# Patient Record
Sex: MALE | Race: BLACK OR AFRICAN AMERICAN | NOT HISPANIC OR LATINO | Employment: FULL TIME | URBAN - METROPOLITAN AREA
[De-identification: names, ages, dates, MRNs, and addresses within clinical notes are randomized per-mention and may not be internally consistent; named-entity substitution may affect disease eponyms.]

---

## 2022-07-14 ENCOUNTER — HOSPITAL ENCOUNTER (EMERGENCY)
Facility: HOSPITAL | Age: 27
Discharge: HOME | End: 2022-07-15
Attending: EMERGENCY MEDICINE
Payer: COMMERCIAL

## 2022-07-14 VITALS
TEMPERATURE: 97 F | WEIGHT: 112 LBS | DIASTOLIC BLOOD PRESSURE: 83 MMHG | OXYGEN SATURATION: 98 % | RESPIRATION RATE: 16 BRPM | SYSTOLIC BLOOD PRESSURE: 123 MMHG | HEART RATE: 70 BPM

## 2022-07-14 DIAGNOSIS — H18.603 KERATOCONUS OF BOTH EYES: Primary | ICD-10-CM

## 2022-07-14 PROCEDURE — 99283 EMERGENCY DEPT VISIT LOW MDM: CPT

## 2022-07-14 NOTE — Clinical Note
Jony Pena was seen and treated in our emergency department on 7/14/2022  Diagnosis:     Kailee Benitez    He may return on this date: If you have any questions or concerns, please don't hesitate to call        Abraham Mascorro MD    ______________________________           _______________          _______________  Hospital Representative                              Date                                Time

## 2022-07-15 PROCEDURE — 99284 EMERGENCY DEPT VISIT MOD MDM: CPT | Performed by: EMERGENCY MEDICINE

## 2022-07-15 RX ORDER — KETOROLAC TROMETHAMINE 5 MG/ML
1 SOLUTION OPHTHALMIC EVERY 6 HOURS
Qty: 3 ML | Refills: 0 | Status: SHIPPED | OUTPATIENT
Start: 2022-07-15

## 2022-07-15 RX ORDER — TETRACAINE HYDROCHLORIDE 5 MG/ML
1 SOLUTION OPHTHALMIC ONCE
Status: COMPLETED | OUTPATIENT
Start: 2022-07-15 | End: 2022-07-15

## 2022-07-15 RX ADMIN — FLUORESCEIN SODIUM 1 STRIP: 1 STRIP OPHTHALMIC at 00:11

## 2022-07-15 RX ADMIN — TETRACAINE HYDROCHLORIDE 1 DROP: 5 SOLUTION OPHTHALMIC at 00:11

## 2022-07-15 NOTE — ED PROVIDER NOTES
Final Diagnosis:  1  Keratoconus of both eyes        Chief Complaint   Patient presents with    Eye Pain     Patient has left eye irritation, has contact in both eyes, put contact in eyes about an hour ago and that's when eye irritation started, also concerned because he does have and eye disease, had surgery on right, doctor in [place] said his contact may be tight on left side     HPI  Patient has h/o keratoconus both eyes  He had corrective surgery for right eye before  Has corrective lenses otherwise  Been wearing left lens for awhile  Today tried ot put it in and irritated eye  Think's it's fitting wrong  Has another to try  When he takes it out feels better but eye sensitive to light and vision wrong  I stain with fluorescein no abrasion  Pressure in right 10 mmhg  No injection  Eye surgoen in [place] following  Planning for corneal transplant left  - No language barrier    - History obtained from patient  - There are no limitations to the history obtained  - Previous charting underwent limited review with attention to last ED visits, labs, ekgs, and prior imaging  PMH:   has a past medical history of Eye abnormality  PSH:   has a past surgical history that includes EYE SURGERY (Right)  Social History:    Tobacco Use: Low Risk     Smoking Tobacco Use: Never Smoker    Smokeless Tobacco Use: Never Used     Alcohol Use: Not on file     Patient with no illicit use    ROS:    Pertinent positives/negatives:   Review of Systems     CONSTITUTIONAL:  No dizziness  No weakness  No unexpected weight loss  EYES:  Discomfort in left eye  No tearing  No change in vsion while contact in place  Light bothersome (baseline)  ENT:  No tinnitus, decreased hearing  No epistaxis/purulent drainage  No voice change, airway closing, trismus  CARDIOVASCULAR:  No chest pain  No palpitations  No new lower extremity edema  RESPIRATORY:  No purulent cough  No hemoptysis  No dyspnea  No paroxysmal nocturnal dyspnea   No stridor, audible wheezing bedside  GASTROINTESTINAL:  Normal appetite  No vomiting, diarrhea  No pain  No bloating  No melena  GENITOURINARY:  No frequency, urgency, nocturia  No hematuria or dysuria  No discharge  No sores/adenopathy  MUSCULOSKELETAL:  No arthralgias or myalgias that are new  INTEGUMENTARY:  No swelling  No unexpected contusions  No abrasions  No lymphangitis  NEUROLOGIC:  No meningismus  No numbness of the extremities  No new focal weakness  No postural instability  PSYCHIATRIC:  No SI HI AVH  HEMATOLOGICAL:  No bleeding  No petechiae  No bruising  ALLERGIES:  No urticaria  No sudden abd cramping  No stridor  PE:     Physical exam highlights:   Physical Exam       Vitals:    07/14/22 2331   BP: 123/83   BP Location: Right arm   Pulse: 70   Resp: 16   Temp: (!) 97 °F (36 1 °C)   TempSrc: Tympanic   SpO2: 98%   Weight: 50 8 kg (112 lb)     Vitals reviewed by me  Nursing note reviewed   Chaperone present for all sensitive exam   Const: No acute distress  Alert  Nontoxic  Not diaphoretic  HEENT: External ears normal  No protrusion drainage swelling  Nose normal  No drainage/traumatic deformity  MMM  Mouth with baseline/symmetric movement  No trismus  Eyes: squinting to light  No icterus  Tracks through the room with normal EOM  No tearing/swelling/drainage  No cloudiness to eye  Symmetric pupils appearance  Muhammad intact   Neck: ROM normal  No rigidity  No meningismus  Cards: Rate as per vitals  Compared to monitor sinus unless documented above  Regular  Well perfused  Pulm: able to verbalize without additional effort  Effort and excursion normal  No disress  No audible wheezing/ stridor  Normal resp rate  Abd: No distension beyond baseline  No fluctuant wave  Patient without peritoneal pain with shifting/bumping the bed  MSK: ROM normal and baseline  No deformity  Skin: No new rashes visible  Well perfused  Neuro: Nonfocal  Baseline  CN grossly intact   Moving all four with coordination  Psych: Normal behavior and affect  A:  - Nursing note reviewed  Ddx and MDM  Pressure 10mmhg                     ED Course as of 07/15/22 0436   Fri Jul 15, 2022   0030 Does not examine like corneal hydrops     No orders to display     No orders of the defined types were placed in this encounter  Labs Reviewed - No data to display    Final Diagnosis:  1  Keratoconus of both eyes        P:  - hospital tx includes   Medications   fluorescein sodium sterile ophthalmic strip 1 strip (1 strip Both Eyes Given 7/15/22 0011)   tetracaine 0 5 % ophthalmic solution 1 drop (1 drop Both Eyes Given 7/15/22 0011)         - disposition  Time reflects when diagnosis was documented in both MDM as applicable and the Disposition within this note     Time User Action Codes Description Comment    7/15/2022 12:33 AM Jonn Obando Add [E21 625] Keratoconus of both eyes       ED Disposition     ED Disposition   Discharge    Condition   Stable    Date/Time   Fri Jul 15, 2022 12:33 AM    Comment   Valdemar Banks discharge to home/self care  Follow-up Information    None         - patient will call their PCP to let them know they were in the emergency department  We discuss return precautions       - additional tx intended, if consistent with primary provider:  - patient to follow with : There are no discharge medications for this patient  No discharge procedures on file  None       Portions of the record may have been created with voice recognition software  Occasional wrong word or "sound a like" substitutions may have occurred due to the inherent limitations of voice recognition software  Read the chart carefully and recognize, using context, where substitutions have occurred      Electronically signed by:  MD Jade May MD  07/15/22 3764

## 2022-10-13 ENCOUNTER — HOSPITAL ENCOUNTER (EMERGENCY)
Facility: HOSPITAL | Age: 27
Discharge: HOME | End: 2022-10-14
Attending: EMERGENCY MEDICINE
Payer: COMMERCIAL

## 2022-10-13 DIAGNOSIS — M54.50 RIGHT LOW BACK PAIN: ICD-10-CM

## 2022-10-13 DIAGNOSIS — R10.9 RIGHT FLANK PAIN: Primary | ICD-10-CM

## 2022-10-13 LAB
ALBUMIN SERPL BCP-MCNC: 4.1 G/DL (ref 3.5–5)
ALP SERPL-CCNC: 88 U/L (ref 46–116)
ALT SERPL W P-5'-P-CCNC: 38 U/L (ref 12–78)
ANION GAP SERPL CALCULATED.3IONS-SCNC: 5 MMOL/L (ref 4–13)
AST SERPL W P-5'-P-CCNC: 38 U/L (ref 5–45)
BASOPHILS # BLD AUTO: 0.03 THOUSANDS/ΜL (ref 0–0.1)
BASOPHILS NFR BLD AUTO: 1 % (ref 0–1)
BILIRUB SERPL-MCNC: 0.63 MG/DL (ref 0.2–1)
BILIRUB UR QL STRIP: NEGATIVE
BUN SERPL-MCNC: 12 MG/DL (ref 5–25)
CALCIUM SERPL-MCNC: 9.2 MG/DL (ref 8.3–10.1)
CHLORIDE SERPL-SCNC: 103 MMOL/L (ref 96–108)
CLARITY UR: NORMAL
CO2 SERPL-SCNC: 28 MMOL/L (ref 21–32)
COLOR UR: YELLOW
CREAT SERPL-MCNC: 0.8 MG/DL (ref 0.6–1.3)
EOSINOPHIL # BLD AUTO: 0.22 THOUSAND/ΜL (ref 0–0.61)
EOSINOPHIL NFR BLD AUTO: 3 % (ref 0–6)
ERYTHROCYTE [DISTWIDTH] IN BLOOD BY AUTOMATED COUNT: 12.2 % (ref 11.6–15.1)
GFR SERPL CREATININE-BSD FRML MDRD: 123 ML/MIN/1.73SQ M
GLUCOSE SERPL-MCNC: 86 MG/DL (ref 65–140)
GLUCOSE UR STRIP-MCNC: NEGATIVE MG/DL
HCT VFR BLD AUTO: 46.2 % (ref 36.5–49.3)
HGB BLD-MCNC: 15.8 G/DL (ref 12–17)
HGB UR QL STRIP.AUTO: NEGATIVE
IMM GRANULOCYTES # BLD AUTO: 0.01 THOUSAND/UL (ref 0–0.2)
IMM GRANULOCYTES NFR BLD AUTO: 0 % (ref 0–2)
KETONES UR STRIP-MCNC: NEGATIVE MG/DL
LEUKOCYTE ESTERASE UR QL STRIP: NEGATIVE
LYMPHOCYTES # BLD AUTO: 2.95 THOUSANDS/ΜL (ref 0.6–4.47)
LYMPHOCYTES NFR BLD AUTO: 46 % (ref 14–44)
MAGNESIUM SERPL-MCNC: 1.8 MG/DL (ref 1.6–2.6)
MCH RBC QN AUTO: 32.1 PG (ref 26.8–34.3)
MCHC RBC AUTO-ENTMCNC: 34.2 G/DL (ref 31.4–37.4)
MCV RBC AUTO: 94 FL (ref 82–98)
MONOCYTES # BLD AUTO: 0.69 THOUSAND/ΜL (ref 0.17–1.22)
MONOCYTES NFR BLD AUTO: 11 % (ref 4–12)
NEUTROPHILS # BLD AUTO: 2.52 THOUSANDS/ΜL (ref 1.85–7.62)
NEUTS SEG NFR BLD AUTO: 39 % (ref 43–75)
NITRITE UR QL STRIP: NEGATIVE
NRBC BLD AUTO-RTO: 0 /100 WBCS
PH UR STRIP.AUTO: 6 [PH]
PLATELET # BLD AUTO: 191 THOUSANDS/UL (ref 149–390)
PMV BLD AUTO: 10.5 FL (ref 8.9–12.7)
POTASSIUM SERPL-SCNC: 4.8 MMOL/L (ref 3.5–5.3)
PROT SERPL-MCNC: 7.8 G/DL (ref 6.4–8.4)
PROT UR STRIP-MCNC: NEGATIVE MG/DL
RBC # BLD AUTO: 4.92 MILLION/UL (ref 3.88–5.62)
SODIUM SERPL-SCNC: 136 MMOL/L (ref 135–147)
SP GR UR STRIP.AUTO: >=1.03 (ref 1–1.03)
UROBILINOGEN UR QL STRIP.AUTO: 0.2 E.U./DL
WBC # BLD AUTO: 6.42 THOUSAND/UL (ref 4.31–10.16)

## 2022-10-13 PROCEDURE — 36415 COLL VENOUS BLD VENIPUNCTURE: CPT | Performed by: EMERGENCY MEDICINE

## 2022-10-13 PROCEDURE — 99284 EMERGENCY DEPT VISIT MOD MDM: CPT | Performed by: EMERGENCY MEDICINE

## 2022-10-13 PROCEDURE — 96374 THER/PROPH/DIAG INJ IV PUSH: CPT

## 2022-10-13 PROCEDURE — 83735 ASSAY OF MAGNESIUM: CPT | Performed by: EMERGENCY MEDICINE

## 2022-10-13 PROCEDURE — 99284 EMERGENCY DEPT VISIT MOD MDM: CPT

## 2022-10-13 PROCEDURE — 81003 URINALYSIS AUTO W/O SCOPE: CPT | Performed by: EMERGENCY MEDICINE

## 2022-10-13 PROCEDURE — 96361 HYDRATE IV INFUSION ADD-ON: CPT

## 2022-10-13 PROCEDURE — 85025 COMPLETE CBC W/AUTO DIFF WBC: CPT | Performed by: EMERGENCY MEDICINE

## 2022-10-13 PROCEDURE — 80053 COMPREHEN METABOLIC PANEL: CPT | Performed by: EMERGENCY MEDICINE

## 2022-10-13 RX ORDER — KETOROLAC TROMETHAMINE 30 MG/ML
15 INJECTION, SOLUTION INTRAMUSCULAR; INTRAVENOUS ONCE
Status: COMPLETED | OUTPATIENT
Start: 2022-10-13 | End: 2022-10-13

## 2022-10-13 RX ORDER — ONDANSETRON 2 MG/ML
4 INJECTION INTRAMUSCULAR; INTRAVENOUS ONCE
Status: DISCONTINUED | OUTPATIENT
Start: 2022-10-13 | End: 2022-10-14 | Stop reason: HOSPADM

## 2022-10-13 RX ADMIN — SODIUM CHLORIDE 1000 ML: 0.9 INJECTION, SOLUTION INTRAVENOUS at 23:50

## 2022-10-13 RX ADMIN — KETOROLAC TROMETHAMINE 15 MG: 30 INJECTION, SOLUTION INTRAMUSCULAR at 23:51

## 2022-10-13 NOTE — Clinical Note
Adam Smith was seen and treated in our emergency department on 10/13/2022  Diagnosis:     Kailee Benitez  may return to work on return date  He may return on this date: 10/14/2022         If you have any questions or concerns, please don't hesitate to call        Delores Henry RN    ______________________________           _______________          _______________  Hospital Representative                              Date                                Time

## 2022-10-14 ENCOUNTER — APPOINTMENT (EMERGENCY)
Dept: RADIOLOGY | Facility: HOSPITAL | Age: 27
End: 2022-10-14
Payer: COMMERCIAL

## 2022-10-14 VITALS
TEMPERATURE: 97.9 F | SYSTOLIC BLOOD PRESSURE: 124 MMHG | RESPIRATION RATE: 18 BRPM | OXYGEN SATURATION: 96 % | WEIGHT: 126 LBS | HEIGHT: 68 IN | DIASTOLIC BLOOD PRESSURE: 84 MMHG | BODY MASS INDEX: 19.1 KG/M2 | HEART RATE: 55 BPM

## 2022-10-14 PROCEDURE — 74177 CT ABD & PELVIS W/CONTRAST: CPT

## 2022-10-14 PROCEDURE — G1004 CDSM NDSC: HCPCS

## 2022-10-14 RX ORDER — LIDOCAINE 50 MG/G
1 PATCH TOPICAL ONCE
Status: DISCONTINUED | OUTPATIENT
Start: 2022-10-14 | End: 2022-10-14 | Stop reason: HOSPADM

## 2022-10-14 RX ORDER — BACLOFEN 10 MG/1
10 TABLET ORAL ONCE
Status: COMPLETED | OUTPATIENT
Start: 2022-10-14 | End: 2022-10-14

## 2022-10-14 RX ORDER — NAPROXEN 500 MG/1
500 TABLET ORAL 2 TIMES DAILY WITH MEALS
Qty: 20 TABLET | Refills: 0 | Status: SHIPPED | OUTPATIENT
Start: 2022-10-14

## 2022-10-14 RX ORDER — BACLOFEN 10 MG/1
10 TABLET ORAL 3 TIMES DAILY
Qty: 30 TABLET | Refills: 0 | Status: SHIPPED | OUTPATIENT
Start: 2022-10-14

## 2022-10-14 RX ORDER — LIDOCAINE 50 MG/G
1 PATCH TOPICAL DAILY
Qty: 30 PATCH | Refills: 0 | Status: SHIPPED | OUTPATIENT
Start: 2022-10-14

## 2022-10-14 RX ADMIN — BACLOFEN 10 MG: 10 TABLET ORAL at 01:34

## 2022-10-14 RX ADMIN — IOHEXOL 100 ML: 350 INJECTION, SOLUTION INTRAVENOUS at 00:45

## 2022-10-14 RX ADMIN — LIDOCAINE 5% 1 PATCH: 700 PATCH TOPICAL at 01:34

## 2022-10-14 NOTE — DISCHARGE INSTRUCTIONS
Do not take the muscle relaxants if you are driving, working, or operating any heavy machineries  Please take a list of all of your medications and discharge paperwork with you to all of your follow-up medical visits  Please take all of your medications as directed  Please call your family doctor or return to the ER if you have increased shortness of breath, chest pain, fevers, chills, nausea, vomiting, diarrhea, or any other worsening symptoms

## 2022-10-14 NOTE — ED PROVIDER NOTES
History  Chief Complaint   Patient presents with   • Flank Pain     Pt c/o right sided flank pain starting in August  Pt states pain is intermittent but today was the worst  Pt states pain does not radiate  Denies nausea, vomiting, painful urination, or trouble urinating  Hx of kidney stones  60-year-old male with past history of kidney stones, presents to the ED for evaluation of right flank pain that has worsened over the past 2 days  Patient states that he works at a [employer] center and he does heavy lifting on a daily basis  Patient has had intermittent right flank pain over the past 2 months  Pain became progressively worse over the past 2 days  Patient denies any fevers or chills  Patient denies any hematuria, dysuria, or any increased urinary frequency  Patient denies any abdominal pain, nausea, vomiting, diarrhea  Patient denies any weakness to the lower extremities  Patient states that his 1st episode of kidney stone was about 8 months ago  His last kidney stone was on his right side  History provided by:  Patient  Flank Pain  Associated symptoms: no chest pain, no cough, no diarrhea, no dysuria, no fatigue, no fever, no nausea, no shortness of breath, no sore throat and no vomiting        Prior to Admission Medications   Prescriptions Last Dose Informant Patient Reported? Taking?   ketorolac (ACULAR) 0 5 % ophthalmic solution   No No   Sig: Administer 1 drop into the left eye every 6 (six) hours      Facility-Administered Medications: None       Past Medical History:   Diagnosis Date   • Eye abnormality        Past Surgical History:   Procedure Laterality Date   • EYE SURGERY Right        History reviewed  No pertinent family history  I have reviewed and agree with the history as documented      E-Cigarette/Vaping   • E-Cigarette Use Never User      E-Cigarette/Vaping Substances     Social History     Tobacco Use   • Smoking status: Never Smoker   • Smokeless tobacco: Never Used Vaping Use   • Vaping Use: Never used   Substance Use Topics   • Alcohol use: Yes     Comment: rare   • Drug use: Yes     Frequency: 7 0 times per week     Types: Marijuana       Review of Systems   Constitutional: Negative for activity change, fatigue and fever  HENT: Negative for congestion, ear discharge and sore throat  Eyes: Negative for pain and redness  Respiratory: Negative for cough, chest tightness, shortness of breath and wheezing  Cardiovascular: Negative for chest pain  Gastrointestinal: Negative for abdominal pain, diarrhea, nausea and vomiting  Endocrine: Negative for cold intolerance  Genitourinary: Positive for flank pain  Negative for dysuria and urgency  Musculoskeletal: Negative for arthralgias and back pain  Neurological: Negative for dizziness, weakness and headaches  Psychiatric/Behavioral: Negative for agitation and behavioral problems  Physical Exam  Physical Exam  Vitals and nursing note reviewed  Constitutional:       Appearance: He is well-developed  HENT:      Head: Normocephalic and atraumatic  Nose: Nose normal    Eyes:      Conjunctiva/sclera: Conjunctivae normal    Cardiovascular:      Rate and Rhythm: Normal rate and regular rhythm  Heart sounds: Normal heart sounds  Pulmonary:      Effort: Pulmonary effort is normal       Breath sounds: Normal breath sounds  Abdominal:      General: Bowel sounds are normal  There is no distension  Palpations: Abdomen is soft  Tenderness: There is no abdominal tenderness  There is right CVA tenderness  Comments: Abdomen is soft, nondistended, with bowel sounds present all 4 quadrants  Tenderness noted to the right CVA region  Musculoskeletal:         General: Normal range of motion  Cervical back: Normal range of motion and neck supple  Skin:     General: Skin is warm  Neurological:      General: No focal deficit present        Mental Status: He is alert and oriented to person, place, and time  Psychiatric:         Mood and Affect: Mood normal          Behavior: Behavior normal          Thought Content:  Thought content normal          Judgment: Judgment normal          Vital Signs  ED Triage Vitals [10/13/22 2244]   Temperature Pulse Respirations Blood Pressure SpO2   97 9 °F (36 6 °C) 62 18 118/78 98 %      Temp Source Heart Rate Source Patient Position - Orthostatic VS BP Location FiO2 (%)   Oral Monitor Sitting Left arm --      Pain Score       4           Vitals:    10/13/22 2244 10/14/22 0017   BP: 118/78 126/78   Pulse: 62 55   Patient Position - Orthostatic VS: Sitting          Visual Acuity      ED Medications  Medications   ondansetron (ZOFRAN) injection 4 mg (0 mg Intravenous Hold 10/13/22 2310)   lidocaine (LIDODERM) 5 % patch 1 patch (has no administration in time range)   baclofen tablet 10 mg (has no administration in time range)   sodium chloride 0 9 % bolus 1,000 mL (1,000 mL Intravenous New Bag 10/13/22 2350)   ketorolac (TORADOL) injection 15 mg (15 mg Intravenous Given 10/13/22 2351)   iohexol (OMNIPAQUE) 350 MG/ML injection (SINGLE-DOSE) 100 mL (100 mL Intravenous Given 10/14/22 0045)       Diagnostic Studies  Results Reviewed     Procedure Component Value Units Date/Time    Comprehensive metabolic panel [[id number]] Collected: 10/13/22 2308    Lab Status: Final result Specimen: Blood from Arm, Right Updated: 10/13/22 2334     Sodium 136 mmol/L      Potassium 4 8 mmol/L      Chloride 103 mmol/L      CO2 28 mmol/L      ANION GAP 5 mmol/L      BUN 12 mg/dL      Creatinine 0 80 mg/dL      Glucose 86 mg/dL      Calcium 9 2 mg/dL      AST 38 U/L      ALT 38 U/L      Alkaline Phosphatase 88 U/L      Total Protein 7 8 g/dL      Albumin 4 1 g/dL      Total Bilirubin 0 63 mg/dL      eGFR 123 ml/min/1 73sq m     Narrative:      Kaylie guidelines for Chronic Kidney Disease (CKD):   •  Stage 1 with normal or high GFR (GFR > 90 mL/min/1 73 square meters)  •  Stage 2 Mild CKD (GFR = 60-89 mL/min/1 73 square meters)  •  Stage 3A Moderate CKD (GFR = 45-59 mL/min/1 73 square meters)  •  Stage 3B Moderate CKD (GFR = 30-44 mL/min/1 73 square meters)  •  Stage 4 Severe CKD (GFR = 15-29 mL/min/1 73 square meters)  •  Stage 5 End Stage CKD (GFR <15 mL/min/1 73 square meters)  Note: GFR calculation is accurate only with a steady state creatinine    Magnesium [[id number]]  (Normal) Collected: 10/13/22 2308    Lab Status: Final result Specimen: Blood from Arm, Right Updated: 10/13/22 2334     Magnesium 1 8 mg/dL     UA w Reflex to Microscopic w Reflex to Culture [[id number]] Collected: 10/13/22 2302    Lab Status: Final result Specimen: Urine, Clean Catch Updated: 10/13/22 2318     Color, UA Yellow     Clarity, UA Slightly Cloudy     Specific Gravity, UA >=1 030     pH, UA 6 0     Leukocytes, UA Negative     Nitrite, UA Negative     Protein, UA Negative mg/dl      Glucose, UA Negative mg/dl      Ketones, UA Negative mg/dl      Urobilinogen, UA 0 2 E U /dl      Bilirubin, UA Negative     Occult Blood, UA Negative    CBC and differential [[id number]]  (Abnormal) Collected: 10/13/22 2308    Lab Status: Final result Specimen: Blood from Arm, Right Updated: 10/13/22 2312     WBC 6 42 Thousand/uL      RBC 4 92 Million/uL      Hemoglobin 15 8 g/dL      Hematocrit 46 2 %      MCV 94 fL      MCH 32 1 pg      MCHC 34 2 g/dL      RDW 12 2 %      MPV 10 5 fL      Platelets 149 Thousands/uL      nRBC 0 /100 WBCs      Neutrophils Relative 39 %      Immat GRANS % 0 %      Lymphocytes Relative 46 %      Monocytes Relative 11 %      Eosinophils Relative 3 %      Basophils Relative 1 %      Neutrophils Absolute 2 52 Thousands/µL      Immature Grans Absolute 0 01 Thousand/uL      Lymphocytes Absolute 2 95 Thousands/µL      Monocytes Absolute 0 69 Thousand/µL      Eosinophils Absolute 0 22 Thousand/µL      Basophils Absolute 0 03 Thousands/µL                  CT abdomen pelvis with contrast Final Result by Brenda Roa MD (16/63 0499)         1  Normal appendix  No evidence of bowel obstruction, colitis or diverticulitis  Stool distends the right colon, possibly indicating constipation  2   No evidence of pyelonephritis or obstructive uropathy  Workstation performed: [id number]                    Procedures  Procedures         ED Course  ED Course as of 10/14/22 0131   Fri Oct 14, 2022   0121 Patient re-examined at bedside  Patient is feeling better with IV fluids and Toradol  At this time patient's pain is most likely musculoskeletal in origin  Patient does do heavy lifting at work  Patient instructed to bend at the knees when picking up objects from the floor  He is also instructed to use back brace when he goes to work  MDM  Number of Diagnoses or Management Options  Right flank pain: new and requires workup  Right low back pain: new and requires workup  Diagnosis management comments: Obtain blood work, UA, CT abdomen/pelvis  Give IV fluids, pain medication and reassess       Amount and/or Complexity of Data Reviewed  Clinical lab tests: reviewed and ordered  Tests in the radiology section of CPT®: ordered and reviewed  Tests in the medicine section of CPT®: ordered and reviewed  Review and summarize past medical records: yes  Independent visualization of images, tracings, or specimens: yes    Risk of Complications, Morbidity, and/or Mortality  General comments: Lab and radiology results were unremarkable  Patient does do heavy lifting at work  At this time patient's flank pain is most likely musculoskeletal in origin  I discussed supportive care such as application of heat as well as wearing back brace, and lifting with bending at the knees  Patient given Lidoderm patch, naproxen as well as baclofen for pain  Patient discharged home with follow-up to PCP    I discussed with patient that he cannot take the muscle relaxants if he is at work, driving or operating any heavy machinery  Close return instructions given to return to the ER for any worsening symptoms  Patient agrees with discharge plan  Patient well appearing at time of discharge  Please Note: Fluency Direct voice recognition software may have been used in the creation of this document  Wrong words or sound a like substitutions may have occurred due to the inherent limitations of the voice software  Patient Progress  Patient progress: improved      Disposition  Final diagnoses:   Right flank pain   Right low back pain     Time reflects when diagnosis was documented in both MDM as applicable and the Disposition within this note     Time User Action Codes Description Comment    10/14/2022  1:25 AM Darlene Hancock Add [R10 9] Right flank pain     10/14/2022  1:25 AM Kiko Sebastian Add [M54 50] Right low back pain       ED Disposition     ED Disposition   Discharge    Condition   Stable    Date/Time   Fri Oct 14, 2022  1:25 AM    Comment   [place] discharge to home/self care                 Follow-up Information     Follow up With Specialties Details Why Contact Info    Your family doctor  Schedule an appointment as soon as possible for a visit in 1 week            Patient's Medications   Discharge Prescriptions    BACLOFEN 10 MG TABLET    Take 1 tablet (10 mg total) by mouth 3 (three) times a day       Start Date: 10/14/2022End Date: --       Order Dose: 10 mg       Quantity: 30 tablet    Refills: 0    LIDOCAINE (LIDODERM) 5 %    Apply 1 patch topically daily Remove & Discard patch within 12 hours or as directed by MD       Start Date: 10/14/2022End Date: --       Order Dose: 1 patch       Quantity: 30 patch    Refills: 0    NAPROXEN (NAPROSYN) 500 MG TABLET    Take 1 tablet (500 mg total) by mouth 2 (two) times a day with meals       Start Date: 10/14/2022End Date: --       Order Dose: 500 mg       Quantity: 20 tablet    Refills: 0       No discharge procedures on file      PDMP Review     None          ED Provider  Electronically Signed by           Dany Mejias DO  10/14/22 0137

## 2022-10-20 ENCOUNTER — HOSPITAL ENCOUNTER (EMERGENCY)
Facility: HOSPITAL | Age: 27
Discharge: HOME | End: 2022-10-21
Attending: EMERGENCY MEDICINE
Payer: COMMERCIAL

## 2022-10-20 DIAGNOSIS — R51.9 HEADACHE: Primary | ICD-10-CM

## 2022-10-20 DIAGNOSIS — J32.9 SINUSITIS: ICD-10-CM

## 2022-10-20 PROCEDURE — 99283 EMERGENCY DEPT VISIT LOW MDM: CPT

## 2022-10-20 NOTE — Clinical Note
Zunilda Maikel was seen and treated in our emergency department on 10/20/2022  Diagnosis:     Loki Valdivia  may return to work on return date  He may return on this date: 10/23/2022         If you have any questions or concerns, please don't hesitate to call        Rishi Arellano RN    ______________________________           _______________          _______________  Hospital Representative                              Date                                Time

## 2022-10-21 VITALS
TEMPERATURE: 98 F | HEART RATE: 68 BPM | BODY MASS INDEX: 19.16 KG/M2 | SYSTOLIC BLOOD PRESSURE: 117 MMHG | OXYGEN SATURATION: 100 % | DIASTOLIC BLOOD PRESSURE: 81 MMHG | RESPIRATION RATE: 18 BRPM | WEIGHT: 126 LBS

## 2022-10-21 LAB
S PYO DNA THROAT QL NAA+PROBE: NOT DETECTED
SARS-COV-2 RNA RESP QL NAA+PROBE: NEGATIVE

## 2022-10-21 PROCEDURE — 99284 EMERGENCY DEPT VISIT MOD MDM: CPT | Performed by: EMERGENCY MEDICINE

## 2022-10-21 PROCEDURE — U0005 INFEC AGEN DETEC AMPLI PROBE: HCPCS | Performed by: EMERGENCY MEDICINE

## 2022-10-21 PROCEDURE — 87651 STREP A DNA AMP PROBE: CPT | Performed by: EMERGENCY MEDICINE

## 2022-10-21 PROCEDURE — U0003 INFECTIOUS AGENT DETECTION BY NUCLEIC ACID (DNA OR RNA); SEVERE ACUTE RESPIRATORY SYNDROME CORONAVIRUS 2 (SARS-COV-2) (CORONAVIRUS DISEASE [COVID-19]), AMPLIFIED PROBE TECHNIQUE, MAKING USE OF HIGH THROUGHPUT TECHNOLOGIES AS DESCRIBED BY CMS-2020-01-R: HCPCS | Performed by: EMERGENCY MEDICINE

## 2022-10-21 RX ORDER — FLUTICASONE PROPIONATE 50 MCG
1 SPRAY, SUSPENSION (ML) NASAL DAILY
Qty: 16 G | Refills: 0 | Status: SHIPPED | OUTPATIENT
Start: 2022-10-21

## 2022-10-21 RX ORDER — NAPROXEN 500 MG/1
500 TABLET ORAL 2 TIMES DAILY WITH MEALS
Qty: 30 TABLET | Refills: 0 | Status: SHIPPED | OUTPATIENT
Start: 2022-10-21

## 2022-10-21 RX ADMIN — DIPHENHYDRAMINE HYDROCHLORIDE 10 ML: 12.5 LIQUID ORAL at 00:36

## 2022-10-21 NOTE — ED PROVIDER NOTES
History  Chief Complaint   Patient presents with   • Headache     Patient c/o  frontal headache starting yesterday, did not take anything for headache also c/o nasal congestion     64-year-old male presents with a headache does sore throat nasal congestion for the past couple days  Denies any nausea vomiting fevers chills who neck pain neck stiffness rash diarrhea or any other symptoms      History provided by:  Patient   used: No        Prior to Admission Medications   Prescriptions Last Dose Informant Patient Reported? Taking?   baclofen 10 mg tablet   No No   Sig: Take 1 tablet (10 mg total) by mouth 3 (three) times a day   ketorolac (ACULAR) 0 5 % ophthalmic solution   No No   Sig: Administer 1 drop into the left eye every 6 (six) hours   lidocaine (Lidoderm) 5 %   No No   Sig: Apply 1 patch topically daily Remove & Discard patch within 12 hours or as directed by MD   naproxen (NAPROSYN) 500 mg tablet   No No   Sig: Take 1 tablet (500 mg total) by mouth 2 (two) times a day with meals      Facility-Administered Medications: None       Past Medical History:   Diagnosis Date   • Eye abnormality        Past Surgical History:   Procedure Laterality Date   • EYE SURGERY Right        History reviewed  No pertinent family history  I have reviewed and agree with the history as documented  E-Cigarette/Vaping   • E-Cigarette Use Never User      E-Cigarette/Vaping Substances     Social History     Tobacco Use   • Smoking status: Current Some Day Smoker     Types: Cigars   • Smokeless tobacco: Never Used   Vaping Use   • Vaping Use: Never used   Substance Use Topics   • Alcohol use: Yes     Comment: rare   • Drug use: Yes     Frequency: 7 0 times per week     Types: Marijuana       Review of Systems   Constitutional: Negative  HENT: Positive for sinus pressure, sinus pain and sore throat  Eyes: Negative  Respiratory: Negative  Cardiovascular: Negative  Gastrointestinal: Negative  Endocrine: Negative  Genitourinary: Negative  Musculoskeletal: Negative  Skin: Negative  Allergic/Immunologic: Negative  Neurological: Positive for headaches  Hematological: Negative  Psychiatric/Behavioral: Negative  All other systems reviewed and are negative  Physical Exam  Physical Exam  Constitutional:       Appearance: Normal appearance  HENT:      Head: Normocephalic and atraumatic  Nose: Nose normal       Mouth/Throat:      Mouth: Mucous membranes are moist    Eyes:      Extraocular Movements: Extraocular movements intact  Pupils: Pupils are equal, round, and reactive to light  Cardiovascular:      Rate and Rhythm: Normal rate and regular rhythm  Pulmonary:      Effort: Pulmonary effort is normal       Breath sounds: Normal breath sounds  Abdominal:      General: Abdomen is flat  Bowel sounds are normal       Palpations: Abdomen is soft  Musculoskeletal:         General: Normal range of motion  Cervical back: Normal range of motion and neck supple  Skin:     General: Skin is warm  Capillary Refill: Capillary refill takes less than 2 seconds  Neurological:      General: No focal deficit present  Mental Status: He is alert and oriented to person, place, and time  Mental status is at baseline  Cranial Nerves: No cranial nerve deficit  Sensory: No sensory deficit  Motor: No weakness  Coordination: Coordination normal       Gait: Gait normal       Deep Tendon Reflexes: Reflexes normal    Psychiatric:         Mood and Affect: Mood normal          Thought Content:  Thought content normal          Vital Signs  ED Triage Vitals [10/21/22 0003]   Temperature Pulse Respirations Blood Pressure SpO2   98 °F (36 7 °C) 68 18 117/81 100 %      Temp src Heart Rate Source Patient Position - Orthostatic VS BP Location FiO2 (%)   -- -- -- -- --      Pain Score       --           Vitals:    10/21/22 0003   BP: 117/81   Pulse: 68 Visual Acuity      ED Medications  Medications   al mag oxide-diphenhydramine-lidocaine viscous (MAGIC MOUTHWASH) suspension 10 mL (10 mL Swish & Swallow Given 10/21/22 0036)       Diagnostic Studies  Results Reviewed     Procedure Component Value Units Date/Time    COVID only [[id number]]  (Normal) Collected: 10/21/22 0016    Lab Status: Final result Specimen: Nares from Nose Updated: 10/21/22 0059     SARS-CoV-2 Negative    Narrative:      FOR PEDIATRIC PATIENTS - copy/paste COVID Guidelines URL to browser: [email or web address]    SARS-CoV-2 assay is a Nucleic Acid Amplification assay intended for the  qualitative detection of nucleic acid from SARS-CoV-2 in nasopharyngeal  swabs  Results are for the presumptive identification of SARS-CoV-2 RNA  Positive results are indicative of infection with SARS-CoV-2, the virus  causing COVID-19, but do not rule out bacterial infection or co-infection  with other viruses  Laboratories within the [place] and its  territories are required to report all positive results to the appropriate  public health authorities  Negative results do not preclude SARS-CoV-2  infection and should not be used as the sole basis for treatment or other  patient management decisions  Negative results must be combined with  clinical observations, patient history, and epidemiological information  This test has not been FDA cleared or approved  This test has been authorized by FDA under an Emergency Use Authorization  (EUA)  This test is only authorized for the duration of time the  declaration that circumstances exist justifying the authorization of the  emergency use of an in vitro diagnostic tests for detection of SARS-CoV-2  virus and/or diagnosis of COVID-19 infection under section 564(b)(1) of  the Act, 21 U  S C  752AZN-4(M)(1), unless the authorization is terminated  or revoked sooner   The test has been validated but independent review by FDA  and CLIA is pending  Test performed using [employer] GeneXpert: This RT-PCR assay targets N2,  a region unique to SARS-CoV-2  A conserved region in the E-gene was chosen  for pan-Sarbecovirus detection which includes SARS-CoV-2  According to CMS-2020-01-R, this platform meets the definition of high-throughput technology  Strep A PCR [[id number]]  (Normal) Collected: 10/21/22 0016    Lab Status: Final result Specimen: Throat Updated: 10/21/22 0052     STREP A PCR Not Detected                 No orders to display              Procedures  Procedures         ED Course                               SBIRT 20yo+    Flowsheet Row Most Recent Value   SBIRT (25 yo +)    In order to provide better care to our patients, we are screening all of our patients for alcohol and drug use  Would it be okay to ask you these screening questions? No Filed at: 10/21/2022 0106                    MDM  Number of Diagnoses or Management Options     Amount and/or Complexity of Data Reviewed  Clinical lab tests: ordered and reviewed  Tests in the medicine section of CPT®: ordered and reviewed    Patient Progress  Patient progress: stable      Disposition  Final diagnoses:   Headache   Sinusitis     Time reflects when diagnosis was documented in both MDM as applicable and the Disposition within this note     Time User Action Codes Description Comment    10/21/2022  1:04 AM Kailee Vera Add [R51 9] Headache     10/21/2022  1:04 AM Yifan Vera Add [J32 9] Sinusitis       ED Disposition     ED Disposition   Discharge    Condition   Stable    Date/Time   Fri Oct 21, 2022  1:04 AM    Comment   [place] discharge to home/self care                 Follow-up Information     Follow up With Specialties Details Why Contact Info Additional Information    [facility]  If symptoms worsen [address] [facility] [address]  [phone] [address] [facility], [facility] [address]          Discharge Medication List as of 10/21/2022  1:05 AM      START taking these medications    Details   fluticasone (FLONASE) 50 mcg/act nasal spray 1 spray into each nostril daily, Starting Fri 10/21/2022, Normal      !! naproxen (Naprosyn) 500 mg tablet Take 1 tablet (500 mg total) by mouth 2 (two) times a day with meals, Starting Fri 10/21/2022, Normal       !! - Potential duplicate medications found  Please discuss with provider  CONTINUE these medications which have NOT CHANGED    Details   baclofen 10 mg tablet Take 1 tablet (10 mg total) by mouth 3 (three) times a day, Starting Fri 10/14/2022, Normal      ketorolac (ACULAR) 0 5 % ophthalmic solution Administer 1 drop into the left eye every 6 (six) hours, Starting Fri 7/15/2022, Normal      lidocaine (Lidoderm) 5 % Apply 1 patch topically daily Remove & Discard patch within 12 hours or as directed by MD, Starting Fri 10/14/2022, Normal      !! naproxen (NAPROSYN) 500 mg tablet Take 1 tablet (500 mg total) by mouth 2 (two) times a day with meals, Starting Fri 10/14/2022, Normal       !! - Potential duplicate medications found  Please discuss with provider  No discharge procedures on file      PDMP Review     None          ED Provider  Electronically Signed by           Corina Granados DO  10/21/22 9682

## 2023-03-07 ENCOUNTER — HOSPITAL ENCOUNTER (EMERGENCY)
Facility: HOSPITAL | Age: 28
Discharge: HOME | End: 2023-03-08
Attending: EMERGENCY MEDICINE | Admitting: EMERGENCY MEDICINE

## 2023-03-07 VITALS
HEART RATE: 70 BPM | BODY MASS INDEX: 19.92 KG/M2 | RESPIRATION RATE: 18 BRPM | TEMPERATURE: 98 F | DIASTOLIC BLOOD PRESSURE: 76 MMHG | OXYGEN SATURATION: 98 % | SYSTOLIC BLOOD PRESSURE: 117 MMHG | WEIGHT: 131 LBS

## 2023-03-07 DIAGNOSIS — B34.9 VIRAL ILLNESS: Primary | ICD-10-CM

## 2023-03-07 NOTE — Clinical Note
Shaye Hardy was seen and treated in our emergency department on 3/7/2023  Diagnosis:     Evelyn Sharif  may return to work on return date  He may return on this date: 03/08/2023         If you have any questions or concerns, please don't hesitate to call        Amber Mora MD    ______________________________           _______________          _______________  Hospital Representative                              Date                                Time

## 2023-03-08 LAB
FLUAV RNA RESP QL NAA+PROBE: NEGATIVE
FLUBV RNA RESP QL NAA+PROBE: NEGATIVE
RSV RNA RESP QL NAA+PROBE: NEGATIVE
SARS-COV-2 RNA RESP QL NAA+PROBE: NEGATIVE

## 2023-03-08 NOTE — ED PROVIDER NOTES
History  Chief Complaint   Patient presents with   • Flu Symptoms     Pt complains of headache, stuffy nose, and sore throat that began last week  Pt near girlfriends son who is also sick with similar symptoms  Denies n/v/d  Pt denies recent covid test       History provided by:  Patient   used: No    Flu Symptoms  Presenting symptoms: fatigue, headache and sore throat    Presenting symptoms comment:  Congestion  Severity:  Moderate  Onset quality:  Gradual  Duration:  4 days  Progression:  Unchanged  Chronicity:  New  Relieved by:  Nothing  Worsened by:  Nothing  Associated symptoms: nasal congestion    Associated symptoms: no chills, no mental status change, no neck stiffness and no syncope    Risk factors: sick contacts    Risk factors: no diabetes problem, no heart disease, no immunocompromised state and no liver disease        Prior to Admission Medications   Prescriptions Last Dose Informant Patient Reported? Taking?   baclofen 10 mg tablet   No No   Sig: Take 1 tablet (10 mg total) by mouth 3 (three) times a day   fluticasone (FLONASE) 50 mcg/act nasal spray   No No   Si spray into each nostril daily   ketorolac (ACULAR) 0 5 % ophthalmic solution   No No   Sig: Administer 1 drop into the left eye every 6 (six) hours   lidocaine (Lidoderm) 5 %   No No   Sig: Apply 1 patch topically daily Remove & Discard patch within 12 hours or as directed by MD   naproxen (NAPROSYN) 500 mg tablet   No No   Sig: Take 1 tablet (500 mg total) by mouth 2 (two) times a day with meals   naproxen (Naprosyn) 500 mg tablet   No No   Sig: Take 1 tablet (500 mg total) by mouth 2 (two) times a day with meals      Facility-Administered Medications: None       Past Medical History:   Diagnosis Date   • Eye abnormality        Past Surgical History:   Procedure Laterality Date   • EYE SURGERY Right        No family history on file  I have reviewed and agree with the history as documented      E-Cigarette/Vaping   • E-Cigarette Use Current Some Day User      E-Cigarette/Vaping Substances   • Nicotine Yes    • THC No    • CBD No    • Flavoring No    • Other No    • Unknown No      Social History     Tobacco Use   • Smoking status: Never   • Smokeless tobacco: Never   Vaping Use   • Vaping Use: Some days   • Substances: Nicotine   Substance Use Topics   • Alcohol use: Yes     Comment: rare   • Drug use: Yes     Frequency: 7 0 times per week     Types: Marijuana       Review of Systems   Constitutional: Positive for fatigue  Negative for chills  HENT: Positive for congestion and sore throat  Musculoskeletal: Negative for neck stiffness  Neurological: Positive for headaches  All other systems reviewed and are negative  Physical Exam  Physical Exam  Vitals and nursing note reviewed  Constitutional:       General: He is not in acute distress  Appearance: He is well-developed  HENT:      Head: Normocephalic and atraumatic  Jaw: No trismus  Nose: Congestion present  Mouth/Throat:      Mouth: Mucous membranes are moist       Pharynx: Oropharynx is clear  No oropharyngeal exudate, posterior oropharyngeal erythema or uvula swelling  Tonsils: No tonsillar exudate or tonsillar abscesses  Eyes:      Conjunctiva/sclera: Conjunctivae normal    Cardiovascular:      Rate and Rhythm: Normal rate and regular rhythm  Heart sounds: No murmur heard  Pulmonary:      Effort: Pulmonary effort is normal  No respiratory distress  Breath sounds: Normal breath sounds  Abdominal:      Palpations: Abdomen is soft  Tenderness: There is no abdominal tenderness  Musculoskeletal:         General: No swelling  Cervical back: Neck supple  No rigidity or tenderness  Skin:     General: Skin is warm and dry  Capillary Refill: Capillary refill takes less than 2 seconds  Neurological:      General: No focal deficit present        Mental Status: He is alert and oriented to person, place, and time  Mental status is at baseline  Sensory: No sensory deficit  Motor: No weakness  Psychiatric:         Mood and Affect: Mood normal          Vital Signs  ED Triage Vitals [03/07/23 2343]   Temperature Pulse Respirations Blood Pressure SpO2   98 °F (36 7 °C) 70 18 117/76 98 %      Temp Source Heart Rate Source Patient Position - Orthostatic VS BP Location FiO2 (%)   Oral Monitor Sitting Left arm --      Pain Score       No Pain           Vitals:    03/07/23 2343   BP: 117/76   Pulse: 70   Patient Position - Orthostatic VS: Sitting         Visual Acuity      ED Medications  Medications - No data to display    Diagnostic Studies  Results Reviewed     Procedure Component Value Units Date/Time    FLU/RSV/COVID - if FLU/RSV clinically relevant [[id number]] Collected: 03/07/23 2356    Lab Status: In process Specimen: Nares from Nose Updated: 03/07/23 2359                 No orders to display              Procedures  Procedures         ED Course                                             Medical Decision Making  41-year-old previously healthy male who is presenting with signs and symptoms of what seems to be a viral illness  He has a sick contact who is at home with similar symptoms  Patient states that he came in just to verify if he has COVID or influenza  He is well-appearing with normal vital signs  He is not hypoxic or febrile  Relatively benign physical examination as well  Plan is to discharge home after viral swab was obtained  States that he would not like to wait for swab results and I informed him that he can get his results on the [employer] vesta at home  Given him instructions regarding supportive care measures and a follow-up plan with his primary care physician  He is also risk encouraged to return to the emergency department anytime for any new or worsening symptoms    Patient states that he understands and agrees with plan as discussed and all questions answered prior to discharge  Disposition  Final diagnoses:   Viral illness     Time reflects when diagnosis was documented in both MDM as applicable and the Disposition within this note     Time User Action Codes Description Comment    3/8/2023 12:01 AM Anastasiia Mckay [B34 9] Viral illness       ED Disposition     ED Disposition   Discharge    Condition   Stable    Date/Time   Wed Mar 8, 2023 12:01 AM    Comment   [place] discharge to home/self care  Follow-up Information     Follow up With Specialties Details Why Contact Info Additional Information    [facility] [address] [facility] Call in 1 day  [address]  [facility] [address] [facility] [address]  [facility] [address] [place] [address], [address] [place], [place], [address], [phone]          Patient's Medications   Discharge Prescriptions    No medications on file       No discharge procedures on file      PDMP Review     None          ED Provider  Electronically Signed by           Angel Guardado MD  03/08/23 3001

## 2023-03-08 NOTE — DISCHARGE INSTRUCTIONS
Check MyChart for your results in a few hours  Turn to the emergency department anytime for any new or worsening symptoms

## 2023-06-02 ENCOUNTER — HOSPITAL ENCOUNTER (EMERGENCY)
Facility: HOSPITAL | Age: 28
Discharge: HOME | End: 2023-06-03
Attending: EMERGENCY MEDICINE
Payer: COMMERCIAL

## 2023-06-02 VITALS
RESPIRATION RATE: 17 BRPM | SYSTOLIC BLOOD PRESSURE: 121 MMHG | DIASTOLIC BLOOD PRESSURE: 81 MMHG | HEIGHT: 68 IN | HEART RATE: 64 BPM | TEMPERATURE: 98.6 F | BODY MASS INDEX: 19.92 KG/M2

## 2023-06-02 DIAGNOSIS — M79.10 MYALGIA: Primary | ICD-10-CM

## 2023-06-02 PROCEDURE — 99282 EMERGENCY DEPT VISIT SF MDM: CPT

## 2023-06-02 NOTE — Clinical Note
Brittani Koch was seen and treated in our emergency department on 6/2/2023  Diagnosis:     Candice Chou  may return to work on return date  He may return on this date: 06/03/2023         If you have any questions or concerns, please don't hesitate to call        Madalyn Garcia MD    ______________________________           _______________          _______________  Hospital Representative                              Date                                Time

## 2023-06-03 NOTE — ED PROVIDER NOTES
"History  Chief Complaint   Patient presents with   • Generalized Body Aches     Pt states that he has been \"overworking himself\" and woke up this morning with body aches  34 yo male c/o body aches s/p lifting a lot of heavy boxes at work last night  Woke up tonight so sore he can't go to work and needs a work note  No associated symptoms  No fever, cough, vomiting  He is here with his girlfriend who has same complaint and needs work note  History provided by:  Patient   used: No    Generalized Body Aches  Associated symptoms: no cough, no diarrhea, no fever and no vomiting        Prior to Admission Medications   Prescriptions Last Dose Informant Patient Reported? Taking?   baclofen 10 mg tablet   No No   Sig: Take 1 tablet (10 mg total) by mouth 3 (three) times a day   fluticasone (FLONASE) 50 mcg/act nasal spray   No No   Si spray into each nostril daily   ketorolac (ACULAR) 0 5 % ophthalmic solution   No No   Sig: Administer 1 drop into the left eye every 6 (six) hours   lidocaine (Lidoderm) 5 %   No No   Sig: Apply 1 patch topically daily Remove & Discard patch within 12 hours or as directed by MD   naproxen (NAPROSYN) 500 mg tablet   No No   Sig: Take 1 tablet (500 mg total) by mouth 2 (two) times a day with meals   naproxen (Naprosyn) 500 mg tablet   No No   Sig: Take 1 tablet (500 mg total) by mouth 2 (two) times a day with meals      Facility-Administered Medications: None       Past Medical History:   Diagnosis Date   • Eye abnormality        Past Surgical History:   Procedure Laterality Date   • EYE SURGERY Right        History reviewed  No pertinent family history  I have reviewed and agree with the history as documented      E-Cigarette/Vaping   • E-Cigarette Use Never User      E-Cigarette/Vaping Substances   • Nicotine No    • THC No    • CBD No    • Flavoring No    • Other No    • Unknown No      Social History     Tobacco Use   • Smoking status: Former     Types: " Cigars   • Smokeless tobacco: Never   Vaping Use   • Vaping Use: Never used   Substance Use Topics   • Alcohol use: Yes     Comment: rare   • Drug use: Yes     Frequency: 7 0 times per week     Types: Marijuana       Review of Systems   Constitutional: Negative for fever  Respiratory: Negative for cough  Gastrointestinal: Negative for diarrhea and vomiting  Physical Exam  Physical Exam  Vitals and nursing note reviewed  Constitutional:       General: He is not in acute distress  Appearance: He is not ill-appearing  Cardiovascular:      Rate and Rhythm: Normal rate and regular rhythm  Heart sounds: Normal heart sounds  Pulmonary:      Effort: Pulmonary effort is normal  No respiratory distress  Breath sounds: Normal breath sounds  Musculoskeletal:         General: No deformity  Normal range of motion  Cervical back: Normal range of motion and neck supple  Neurological:      General: No focal deficit present  Mental Status: He is alert and oriented to person, place, and time  Gait: Gait normal    Psychiatric:         Mood and Affect: Mood normal          Behavior: Behavior normal          Vital Signs  ED Triage Vitals [06/02/23 2343]   Temperature Pulse Respirations Blood Pressure SpO2   98 6 °F (37 °C) 64 17 121/81 --      Temp Source Heart Rate Source Patient Position - Orthostatic VS BP Location FiO2 (%)   Oral Monitor Sitting Right arm --      Pain Score       No Pain           Vitals:    06/02/23 2343   BP: 121/81   Pulse: 64   Patient Position - Orthostatic VS: Sitting         Visual Acuity      ED Medications  Medications - No data to display    Diagnostic Studies  Results Reviewed     None                 No orders to display              Procedures  Procedures         ED Course                               SBIRT 22yo+    Flowsheet Row Most Recent Value   Initial Alcohol Screen: US AUDIT-C     1  How often do you have a drink containing alcohol?  1 Filed at: 06/02/2023 2347   2  How many drinks containing alcohol do you have on a typical day you are drinking? 0 Filed at: 06/02/2023 2347   3a  Male UNDER 65: How often do you have five or more drinks on one occasion? 1 Filed at: 06/02/2023 2347   Audit-C Score 2 Filed at: 06/02/2023 2347                    Medical Decision Making  Given work note for tonight  Advised rest, tylenol/advil prn  Disposition  Final diagnoses:   Myalgia     Time reflects when diagnosis was documented in both MDM as applicable and the Disposition within this note     Time User Action Codes Description Comment    9/9/3937 92:86 PM Nick Bowman Add [M54 15] Myalgia       ED Disposition     ED Disposition   Discharge    Condition   Stable    Date/Time   Fri Jun 2, 2023 11:52 PM    [address] discharge to home/self care  Follow-up Information     Follow up With Specialties Details Why Contact Info    your doctor   As needed           Patient's Medications   Discharge Prescriptions    No medications on file       No discharge procedures on file      PDMP Review     None          ED Provider  Electronically Signed by           Dileep Alejandro MD  26/42/39 5365

## 2023-07-14 ENCOUNTER — HOSPITAL ENCOUNTER (EMERGENCY)
Facility: HOSPITAL | Age: 28
Discharge: HOME | End: 2023-07-14
Attending: EMERGENCY MEDICINE
Payer: COMMERCIAL

## 2023-07-14 VITALS
SYSTOLIC BLOOD PRESSURE: 113 MMHG | RESPIRATION RATE: 16 BRPM | HEART RATE: 59 BPM | DIASTOLIC BLOOD PRESSURE: 69 MMHG | TEMPERATURE: 96.8 F | OXYGEN SATURATION: 99 % | WEIGHT: 113 LBS | BODY MASS INDEX: 17.18 KG/M2

## 2023-07-14 DIAGNOSIS — K08.89 DENTALGIA: Primary | ICD-10-CM

## 2023-07-14 PROCEDURE — 99282 EMERGENCY DEPT VISIT SF MDM: CPT

## 2023-07-14 RX ORDER — CHLORHEXIDINE GLUCONATE 0.12 MG/ML
15 RINSE ORAL 2 TIMES DAILY
Qty: 120 ML | Refills: 0 | Status: SHIPPED | OUTPATIENT
Start: 2023-07-14

## 2023-07-14 RX ORDER — NAPROXEN 500 MG/1
500 TABLET ORAL 2 TIMES DAILY WITH MEALS
Qty: 30 TABLET | Refills: 0 | Status: SHIPPED | OUTPATIENT
Start: 2023-07-14

## 2023-07-14 RX ORDER — NAPROXEN 500 MG/1
500 TABLET ORAL ONCE
Status: COMPLETED | OUTPATIENT
Start: 2023-07-14 | End: 2023-07-14

## 2023-07-14 RX ORDER — AMOXICILLIN 500 MG/1
500 CAPSULE ORAL 3 TIMES DAILY
Qty: 15 CAPSULE | Refills: 0 | Status: SHIPPED | OUTPATIENT
Start: 2023-07-14 | End: 2023-07-19

## 2023-07-14 RX ORDER — AMOXICILLIN 250 MG/1
500 CAPSULE ORAL ONCE
Status: COMPLETED | OUTPATIENT
Start: 2023-07-14 | End: 2023-07-14

## 2023-07-14 RX ADMIN — NAPROXEN 500 MG: 500 TABLET ORAL at 00:25

## 2023-07-14 RX ADMIN — AMOXICILLIN 500 MG: 250 CAPSULE ORAL at 00:25

## 2023-07-14 NOTE — Clinical Note
Christianantonio Amando was seen and treated in our emergency department on 7/14/2023. No restrictions            Diagnosis:     Gilbert Richardson  may return to work on return date. He may return on this date: 07/14/2023         If you have any questions or concerns, please don't hesitate to call.       Katie Nava MD    ______________________________           _______________          _______________  Hospital Representative                              Date                                Time

## 2023-07-14 NOTE — ED PROVIDER NOTES
Final Diagnosis:  1. Dentalgia        No chief complaint on file. HPI  ***    EMS reports if applicable: N/A ***    - Previous charting underwent limited review with attention to last ED visits, labs, ekgs, and prior imaging. Chart review reveals : ***    Admission on 03/07/2023, Discharged on 03/08/2023   Component Date Value Ref Range Status   • SARS-CoV-2 03/07/2023 Negative  Negative Final   • INFLUENZA A PCR 03/07/2023 Negative  Negative Final   • INFLUENZA B PCR 03/07/2023 Negative  Negative Final   • RSV PCR 03/07/2023 Negative  Negative Final       - No*** language barrier.   - History obtained from patient ***.   - There are no*** limitations to the history obtained. - Discuss patient's care, with patient permission or by chart review, with ***     PMH:   has a past medical history of Eye abnormality. PSH:   has a past surgical history that includes EYE SURGERY (Right). Social History:  Tobacco Use: Medium Risk (6/2/2023)    Patient History    • Smoking Tobacco Use: Former    • Smokeless Tobacco Use: Never    • Passive Exposure: Not on file     Alcohol Use: Not on file     No illicit use ***      ROS:  Pertinent positives/negatives: ***. Some ROS may be present in the HPI and would take precedent over these standard questions asked below. Review of Systems     CONSTITUTIONAL:  No lethargy. No weakness. No unexpected weight loss. No appetite change. EYES:  No pain, redness, or discharge. No loss of vision. No orbital trauma or pain. ENT:  No tinnitus or decreased hearing. No epistaxis/purulent rhinorrhea. No voice change, airway closing, trismus. CARDIOVASCULAR:  No chest pain. No skin mottling or pallor. No change in exertional capacity  RESPIRATORY:  No hemoptysis. No paroxysmal nocturnal dyspnea. No stridor. No audible wheezing. No production with cough. GASTROINTESTINAL:  Normal appetite. No vomiting, diarrhea. No pain. No bloating. No melena. No hematochezia.    GENITOURINARY: No frequency, urgency, nocturia. No hematuria or dysuria. No discharge. No sores/adenopathy. MUSCULOSKELETAL:  No arthralgias or myalgias that are new. No new deformity. INTEGUMENTARY:  No swelling. No unexpected contusions. No abrasions. No lymphangitis. NEUROLOGIC:  No meningismus. No new numbness of the extremities. No new focal weakness. No postural instability  PSYCHIATRIC:  No SI HI AVH  HEMATOLOGICAL:  No bleeding. No petechiae. No bruising. ALLERGIES:  No urticaria. No sudden abd cramping. No stridor. PE:     Physical exam highlights: ***  Physical Exam       There were no vitals filed for this visit. Vitals reviewed by me. Nursing note reviewed  Chaperone present for all sensitive exam.  Const: No acute distress. Alert. Nontoxic. Not diaphoretic. HEENT: External ears normal. No protrusion drainage swelling. Nose normal. No drainage/traumatic deformity. MMM. Mouth with baseline/symmetric movement. No trismus. Eyes: No squinting. No icterus. No tearing/swelling/drainage. Tracks through the room with normal EOM. Neck: ROM normal. No rigidity. No meningismus. Cards: Rate as per vitals Compared to monitor sinus unless documented. Regular Well perfused. Pulm: able to verbalize without additional effort. Effort and excursion normal. No distress. No audible wheezing/ stridor. Normal resp rate without retraction or change in work of breathing. Abd: No distension beyond baseline. No fluctuant wave. Patient without peritoneal pain with shifting/bumping the bed. MSK: ROM normal baseline. No deformity. No contractures from baseline. Skin: No new rashes visible. Well perfused. No wounds visualized on exposed skin  Neuro: Nonfocal. Baseline. CN grossly intact. Moving all four with coordination. Psych: Normal behavior and affect. A:  - Nursing note reviewed.     Ddx and MDM  Considered diagnoses  ***          My conversation with consultant reveals: NA ***      Decision rules: My read of the XR/CT scan reveals:  NA ***  No orders to display       No orders of the defined types were placed in this encounter. Labs Reviewed - No data to display    *Each of these labs was reviewed. Particular standout labs will be noted in the ED Course above     Final Diagnosis:  1. Dentalgia          P:  - hospital tx includes ***  Medications   naproxen (NAPROSYN) tablet 500 mg (has no administration in time range)   amoxicillin (AMOXIL) capsule 500 mg (has no administration in time range)         - disposition***  Time reflects when diagnosis was documented in both MDM as applicable and the Disposition within this note     Time User Action Codes Description Comment    7/14/2023 12:17 AM Angela Dodge Add [K08.89] [address] [facility]       ED Disposition     ED Disposition   Discharge    Condition   Stable    Date/Time   Fri Jul 14, 2023 12:17 AM    Comment   [address] [facility] discharge to home/self care. Follow-up Information    None         - patient will call their PCP to let them know they were in the emergency department. We discuss return precautions and patient is agreeable with plan and aformentioned disposition.        - additional treatment intended, if consistent with primary provider:  - patient to follow with :      Patient's Medications   Discharge Prescriptions    AMOXICILLIN (AMOXIL) 500 MG CAPSULE    Take 1 capsule (500 mg total) by mouth 3 (three) times a day for 5 days       Start Date: 7/14/2023 End Date: 7/19/2023       Order Dose: 500 mg       Quantity: 15 capsule    Refills: 0    CHLORHEXIDINE (PERIDEX) 0.12 % SOLUTION    Apply 15 mL to the mouth or throat 2 (two) times a day       Start Date: 7/14/2023 End Date: --       Order Dose: 15 mL       Quantity: 120 mL    Refills: 0    NAPROXEN (NAPROSYN) 500 MG TABLET    Take 1 tablet (500 mg total) by mouth 2 (two) times a day with meals       Start Date: 7/14/2023 End Date: --       Order Dose: 500 mg Quantity: 30 tablet    Refills: 0     No discharge procedures on file. Prior to Admission Medications   Prescriptions Last Dose Informant Patient Reported? Taking?   baclofen 10 mg tablet   No No   Sig: Take 1 tablet (10 mg total) by mouth 3 (three) times a day   fluticasone (FLONASE) 50 mcg/act nasal spray   No No   Si spray into each nostril daily   ketorolac (ACULAR) 0.5 % ophthalmic solution   No No   Sig: Administer 1 drop into the left eye every 6 (six) hours   lidocaine (Lidoderm) 5 %   No No   Sig: Apply 1 patch topically daily Remove & Discard patch within 12 hours or as directed by MD   naproxen (NAPROSYN) 500 mg tablet   No No   Sig: Take 1 tablet (500 mg total) by mouth 2 (two) times a day with meals   naproxen (Naprosyn) 500 mg tablet   No No   Sig: Take 1 tablet (500 mg total) by mouth 2 (two) times a day with meals      Facility-Administered Medications: None       Portions of the record may have been created with voice recognition software. Occasional wrong word or "sound a like" substitutions may have occurred due to the inherent limitations of voice recognition software. Read the chart carefully and recognize, using context, where substitutions have occurred.     Electronically signed by:  Pearley Canavan, MD 7/14/2023   ketorolac (ACULAR) 0.5 % ophthalmic solution Not Taking  No No   Sig: Administer 1 drop into the left eye every 6 (six) hours   Patient not taking: Reported on 7/14/2023   lidocaine (Lidoderm) 5 % Not Taking  No No   Sig: Apply 1 patch topically daily Remove & Discard patch within 12 hours or as directed by MD   Patient not taking: Reported on 7/14/2023   naproxen (NAPROSYN) 500 mg tablet Not Taking  No No   Sig: Take 1 tablet (500 mg total) by mouth 2 (two) times a day with meals   Patient not taking: Reported on 7/14/2023   naproxen (Naprosyn) 500 mg tablet Not Taking  No No   Sig: Take 1 tablet (500 mg total) by mouth 2 (two) times a day with meals   Patient not taking: Reported on 7/14/2023      Facility-Administered Medications: None       Portions of the record may have been created with voice recognition software. Occasional wrong word or "sound a like" substitutions may have occurred due to the inherent limitations of voice recognition software. Read the chart carefully and recognize, using context, where substitutions have occurred.     Electronically signed by:  MD Anjel Pace MD  07/19/23 [id number]

## 2023-09-19 PROCEDURE — 99283 EMERGENCY DEPT VISIT LOW MDM: CPT

## 2023-09-19 PROCEDURE — 99284 EMERGENCY DEPT VISIT MOD MDM: CPT | Performed by: EMERGENCY MEDICINE

## 2023-09-20 ENCOUNTER — HOSPITAL ENCOUNTER (EMERGENCY)
Facility: HOSPITAL | Age: 28
Discharge: HOME | End: 2023-09-20
Attending: EMERGENCY MEDICINE
Payer: COMMERCIAL

## 2023-09-20 VITALS
SYSTOLIC BLOOD PRESSURE: 138 MMHG | RESPIRATION RATE: 16 BRPM | OXYGEN SATURATION: 99 % | HEIGHT: 68 IN | BODY MASS INDEX: 18.43 KG/M2 | TEMPERATURE: 98 F | WEIGHT: 121.6 LBS | HEART RATE: 51 BPM | DIASTOLIC BLOOD PRESSURE: 81 MMHG

## 2023-09-20 DIAGNOSIS — K52.9 GASTROENTERITIS: Primary | ICD-10-CM

## 2023-09-20 RX ORDER — MAGNESIUM HYDROXIDE/ALUMINUM HYDROXICE/SIMETHICONE 120; 1200; 1200 MG/30ML; MG/30ML; MG/30ML
30 SUSPENSION ORAL ONCE
Status: COMPLETED | OUTPATIENT
Start: 2023-09-20 | End: 2023-09-20

## 2023-09-20 RX ORDER — FAMOTIDINE 20 MG/1
20 TABLET, FILM COATED ORAL ONCE
Status: COMPLETED | OUTPATIENT
Start: 2023-09-20 | End: 2023-09-20

## 2023-09-20 RX ORDER — ACETAMINOPHEN 500 MG
1000 TABLET ORAL EVERY 8 HOURS PRN
Qty: 40 TABLET | Refills: 0 | Status: SHIPPED | OUTPATIENT
Start: 2023-09-20

## 2023-09-20 RX ORDER — NAPROXEN 500 MG/1
500 TABLET ORAL ONCE
Status: COMPLETED | OUTPATIENT
Start: 2023-09-20 | End: 2023-09-20

## 2023-09-20 RX ORDER — ACETAMINOPHEN 325 MG/1
975 TABLET ORAL ONCE
Status: COMPLETED | OUTPATIENT
Start: 2023-09-20 | End: 2023-09-20

## 2023-09-20 RX ORDER — DICYCLOMINE HYDROCHLORIDE 10 MG/1
20 CAPSULE ORAL ONCE
Status: COMPLETED | OUTPATIENT
Start: 2023-09-20 | End: 2023-09-20

## 2023-09-20 RX ORDER — ONDANSETRON 4 MG/1
4 TABLET, ORALLY DISINTEGRATING ORAL EVERY 6 HOURS PRN
Qty: 20 TABLET | Refills: 0 | Status: SHIPPED | OUTPATIENT
Start: 2023-09-20

## 2023-09-20 RX ORDER — DICYCLOMINE HCL 20 MG
20 TABLET ORAL 2 TIMES DAILY
Qty: 20 TABLET | Refills: 0 | Status: SHIPPED | OUTPATIENT
Start: 2023-09-20

## 2023-09-20 RX ADMIN — ALUMINUM HYDROXIDE, MAGNESIUM HYDROXIDE, AND DIMETHICONE 30 ML: 200; 20; 200 SUSPENSION ORAL at 02:30

## 2023-09-20 RX ADMIN — ACETAMINOPHEN 975 MG: 325 TABLET ORAL at 02:30

## 2023-09-20 RX ADMIN — NAPROXEN 500 MG: 500 TABLET ORAL at 02:30

## 2023-09-20 RX ADMIN — FAMOTIDINE 20 MG: 20 TABLET, FILM COATED ORAL at 02:30

## 2023-09-20 RX ADMIN — DICYCLOMINE HYDROCHLORIDE 20 MG: 10 CAPSULE ORAL at 02:33

## 2023-09-26 NOTE — ED PROVIDER NOTES
Final Diagnosis:  1. Gastroenteritis        Chief Complaint   Patient presents with   • Abdominal Pain     Pt reports Sunday he has chinese and Monday he awoke with upper abdominal pain, indigestion, diarrhea, nausea, vomiting, and loss of appetite. HPI  Patient notes that he ate some chinese that upset his stomach the last couple days. He's starting to feel better but looking for something to help his symptoms of nausea and cramping abd discomfort. No marizol pain. No prior abd surgeries. No contrib medical hx. He asks for a note for work backdating a few days    EMS reports if applicable: N/A     - Previous charting underwent limited review with attention to last ED visits, labs, ekgs, and prior imaging. Chart review reveals :     Admission on 03/07/2023, Discharged on 03/08/2023   Component Date Value Ref Range Status   • SARS-CoV-2 03/07/2023 Negative  Negative Final   • INFLUENZA A PCR 03/07/2023 Negative  Negative Final   • INFLUENZA B PCR 03/07/2023 Negative  Negative Final   • RSV PCR 03/07/2023 Negative  Negative Final       - No language barrier.   - History obtained from patient . - There are no limitations to the history obtained. - Discuss patient's care, with patient permission or by chart review, with      PMH:   has a past medical history of Eye abnormality. PSH:   has a past surgical history that includes EYE SURGERY (Right). Social History:  Tobacco Use: Medium Risk (9/20/2023)    Patient History    • Smoking Tobacco Use: Former    • Smokeless Tobacco Use: Never    • Passive Exposure: Not on file     Alcohol Use: Not on file     No illicit use       ROS:  Pertinent positives/negatives: Kellee Smalls Some ROS may be present in the HPI and would take precedent over these standard questions asked below. Review of Systems   Gastrointestinal: Positive for abdominal pain, diarrhea, nausea and vomiting. CONSTITUTIONAL:  No lethargy. No weakness. No unexpected weight loss.  No appetite change. EYES:  No pain, redness, or discharge. No loss of vision. No orbital trauma or pain. ENT:  No tinnitus or decreased hearing. No epistaxis/purulent rhinorrhea. No voice change, airway closing, trismus. CARDIOVASCULAR:  No chest pain. No skin mottling or pallor. No change in exertional capacity  RESPIRATORY:  No hemoptysis. No paroxysmal nocturnal dyspnea. No stridor. No audible wheezing. No production with cough. GASTROINTESTINAL:  Normal appetite. No vomiting, diarrhea. No pain. No bloating. No melena. No hematochezia. GENITOURINARY:  No frequency, urgency, nocturia. No hematuria or dysuria. No discharge. No sores/adenopathy. MUSCULOSKELETAL:  No arthralgias or myalgias that are new. No new deformity. INTEGUMENTARY:  No swelling. No unexpected contusions. No abrasions. No lymphangitis. NEUROLOGIC:  No meningismus. No new numbness of the extremities. No new focal weakness. No postural instability  PSYCHIATRIC:  No SI HI AVH  HEMATOLOGICAL:  No bleeding. No petechiae. No bruising. ALLERGIES:  No urticaria. No sudden abd cramping. No stridor. PE:     Physical exam highlights:   Physical Exam       Vitals:    09/20/23 0028   BP: 138/81   BP Location: Right arm   Pulse: (!) 51   Resp: 16   Temp: 98 °F (36.7 °C)   TempSrc: Tympanic   SpO2: 99%   Weight: 55.2 kg (121 lb 9.6 oz)   Height: 5' 8" (1.727 m)     Vitals reviewed by me. Nursing note reviewed  Chaperone present for all sensitive exam.  Const: No acute distress. Alert. Nontoxic. Not diaphoretic. HEENT: External ears normal. No protrusion drainage swelling. Nose normal. No drainage/traumatic deformity. MMM. Mouth with baseline/symmetric movement. No trismus. Eyes: No squinting. No icterus. No tearing/swelling/drainage. Tracks through the room with normal EOM. Neck: ROM normal. No rigidity. No meningismus. Cards: Rate as per vitals Compared to monitor sinus unless documented. Regular Well perfused.   Pulm: able to verbalize without additional effort. Effort and excursion normal. No distress. No audible wheezing/ stridor. Normal resp rate without retraction or change in work of breathing. Abd: No distension beyond baseline. No fluctuant wave. Patient without peritoneal pain with shifting/bumping the bed. MSK: ROM normal baseline. No deformity. No contractures from baseline. Skin: No new rashes visible. Well perfused. No wounds visualized on exposed skin  Neuro: Nonfocal. Baseline. CN grossly intact. Moving all four with coordination. Psych: Normal behavior and affect. A:  - Nursing note reviewed. Ddx and MDM  Considered diagnoses  I suspect this patient is seeking a work note based on prior chart review    However, will treat patient symptomatologically    Declines any workup    My conversation with consultant reveals: NA       Decision rules:                           My read of the XR/CT scan reveals:  NA   No orders to display       No orders of the defined types were placed in this encounter. Labs Reviewed - No data to display    *Each of these labs was reviewed. Particular standout labs will be noted in the ED Course above     Final Diagnosis:  1.  Gastroenteritis          P:  - hospital tx includes   Medications   dicyclomine (BENTYL) capsule 20 mg (20 mg Oral Given 9/20/23 0233)   acetaminophen (TYLENOL) tablet 975 mg (975 mg Oral Given 9/20/23 0230)   aluminum-magnesium hydroxide-simethicone (MAALOX) oral suspension 30 mL (30 mL Oral Given 9/20/23 0230)   naproxen (NAPROSYN) tablet 500 mg (500 mg Oral Given 9/20/23 0230)   famotidine (PEPCID) tablet 20 mg (20 mg Oral Given 9/20/23 0230)         - disposition  Time reflects when diagnosis was documented in both MDM as applicable and the Disposition within this note     Time User Action Codes Description Comment    9/20/2023  2:17 AM Michial Guardian Add [K52.9] Gastroenteritis       ED Disposition     ED Disposition   Discharge    Condition   Stable    Date/Time   Wed Sep 20, 2023  2:19 AM    Comment   [address] [facility] discharge to home/self care. Follow-up Information    None         - patient will call their PCP to let them know they were in the emergency department. We discuss return precautions and patient is agreeable with plan and aformentioned disposition.        - additional treatment intended, if consistent with primary provider:  - patient to follow with :      Discharge Medication List as of 9/20/2023  2:19 AM      START taking these medications    Details   acetaminophen (TYLENOL) 500 mg tablet Take 2 tablets (1,000 mg total) by mouth every 8 (eight) hours as needed for moderate pain or mild pain, Starting Wed 9/20/2023, Normal      dicyclomine (BENTYL) 20 mg tablet Take 1 tablet (20 mg total) by mouth 2 (two) times a day, Starting Wed 9/20/2023, Normal      ondansetron (ZOFRAN-ODT) 4 mg disintegrating tablet Take 1 tablet (4 mg total) by mouth every 6 (six) hours as needed for nausea or vomiting, Starting Wed 9/20/2023, Normal         CONTINUE these medications which have NOT CHANGED    Details   baclofen 10 mg tablet Take 1 tablet (10 mg total) by mouth 3 (three) times a day, Starting Fri 10/14/2022, Normal      chlorhexidine (PERIDEX) 0.12 % solution Apply 15 mL to the mouth or throat 2 (two) times a day, Starting Fri 7/14/2023, Normal      fluticasone (FLONASE) 50 mcg/act nasal spray 1 spray into each nostril daily, Starting Fri 10/21/2022, Normal      ketorolac (ACULAR) 0.5 % ophthalmic solution Administer 1 drop into the left eye every 6 (six) hours, Starting Fri 7/15/2022, Normal      lidocaine (Lidoderm) 5 % Apply 1 patch topically daily Remove & Discard patch within 12 hours or as directed by MD, Starting Fri 10/14/2022, Normal      !! naproxen (NAPROSYN) 500 mg tablet Take 1 tablet (500 mg total) by mouth 2 (two) times a day with meals, Starting Fri 10/14/2022, Normal      !! naproxen (Naprosyn) 500 mg tablet Take 1 tablet (500 mg total) by mouth 2 (two) times a day with meals, Starting Fri 10/21/2022, Normal      !! naproxen (Naprosyn) 500 mg tablet Take 1 tablet (500 mg total) by mouth 2 (two) times a day with meals, Starting 2023, Normal       !! - Potential duplicate medications found. Please discuss with provider. No discharge procedures on file. Prior to Admission Medications   Prescriptions Last Dose Informant Patient Reported? Taking?   baclofen 10 mg tablet   No No   Sig: Take 1 tablet (10 mg total) by mouth 3 (three) times a day   Patient not taking: Reported on 2023   chlorhexidine (PERIDEX) 0.12 % solution   No No   Sig: Apply 15 mL to the mouth or throat 2 (two) times a day   fluticasone (FLONASE) 50 mcg/act nasal spray   No No   Si spray into each nostril daily   Patient not taking: Reported on 2023   ketorolac (ACULAR) 0.5 % ophthalmic solution   No No   Sig: Administer 1 drop into the left eye every 6 (six) hours   Patient not taking: Reported on 2023   lidocaine (Lidoderm) 5 %   No No   Sig: Apply 1 patch topically daily Remove & Discard patch within 12 hours or as directed by MD   Patient not taking: Reported on 2023   naproxen (NAPROSYN) 500 mg tablet   No No   Sig: Take 1 tablet (500 mg total) by mouth 2 (two) times a day with meals   Patient not taking: Reported on 2023   naproxen (Naprosyn) 500 mg tablet   No No   Sig: Take 1 tablet (500 mg total) by mouth 2 (two) times a day with meals   Patient not taking: Reported on 2023   naproxen (Naprosyn) 500 mg tablet   No No   Sig: Take 1 tablet (500 mg total) by mouth 2 (two) times a day with meals      Facility-Administered Medications: None       Portions of the record may have been created with voice recognition software. Occasional wrong word or "sound a like" substitutions may have occurred due to the inherent limitations of voice recognition software.  Read the chart carefully and recognize, using context, where substitutions have occurred.     Electronically signed by:  MD Lul Cash MD  09/26/23 0747

## 2023-10-17 ENCOUNTER — HOSPITAL ENCOUNTER (EMERGENCY)
Facility: HOSPITAL | Age: 28
Discharge: HOME | End: 2023-10-18
Attending: EMERGENCY MEDICINE
Payer: COMMERCIAL

## 2023-10-17 VITALS
DIASTOLIC BLOOD PRESSURE: 58 MMHG | BODY MASS INDEX: 19.58 KG/M2 | RESPIRATION RATE: 18 BRPM | HEART RATE: 61 BPM | WEIGHT: 129.2 LBS | OXYGEN SATURATION: 99 % | SYSTOLIC BLOOD PRESSURE: 117 MMHG | TEMPERATURE: 97.8 F | HEIGHT: 68 IN

## 2023-10-17 DIAGNOSIS — R10.11 RUQ ABDOMINAL PAIN: Primary | ICD-10-CM

## 2023-10-17 LAB
ALBUMIN SERPL BCP-MCNC: 4.2 G/DL (ref 3.5–5)
ALP SERPL-CCNC: 66 U/L (ref 34–104)
ALT SERPL W P-5'-P-CCNC: 8 U/L (ref 7–52)
ANION GAP SERPL CALCULATED.3IONS-SCNC: 5 MMOL/L
AST SERPL W P-5'-P-CCNC: 14 U/L (ref 13–39)
BASOPHILS # BLD AUTO: 0.02 THOUSANDS/ÂΜL (ref 0–0.1)
BASOPHILS NFR BLD AUTO: 0 % (ref 0–1)
BILIRUB DIRECT SERPL-MCNC: 0.09 MG/DL (ref 0–0.2)
BILIRUB SERPL-MCNC: 0.6 MG/DL (ref 0.2–1)
BUN SERPL-MCNC: 13 MG/DL (ref 5–25)
CALCIUM SERPL-MCNC: 8.9 MG/DL (ref 8.4–10.2)
CHLORIDE SERPL-SCNC: 105 MMOL/L (ref 96–108)
CO2 SERPL-SCNC: 28 MMOL/L (ref 21–32)
CREAT SERPL-MCNC: 1.01 MG/DL (ref 0.6–1.3)
EOSINOPHIL # BLD AUTO: 0.29 THOUSAND/ÂΜL (ref 0–0.61)
EOSINOPHIL NFR BLD AUTO: 4 % (ref 0–6)
ERYTHROCYTE [DISTWIDTH] IN BLOOD BY AUTOMATED COUNT: 12.1 % (ref 11.6–15.1)
GFR SERPL CREATININE-BSD FRML MDRD: 101 ML/MIN/1.73SQ M
GLUCOSE SERPL-MCNC: 94 MG/DL (ref 65–140)
HCT VFR BLD AUTO: 42.1 % (ref 36.5–49.3)
HGB BLD-MCNC: 14.6 G/DL (ref 12–17)
IMM GRANULOCYTES # BLD AUTO: 0.01 THOUSAND/UL (ref 0–0.2)
IMM GRANULOCYTES NFR BLD AUTO: 0 % (ref 0–2)
LIPASE SERPL-CCNC: 49 U/L (ref 11–82)
LYMPHOCYTES # BLD AUTO: 3.51 THOUSANDS/ÂΜL (ref 0.6–4.47)
LYMPHOCYTES NFR BLD AUTO: 51 % (ref 14–44)
MCH RBC QN AUTO: 33.4 PG (ref 26.8–34.3)
MCHC RBC AUTO-ENTMCNC: 34.7 G/DL (ref 31.4–37.4)
MCV RBC AUTO: 96 FL (ref 82–98)
MONOCYTES # BLD AUTO: 0.65 THOUSAND/ÂΜL (ref 0.17–1.22)
MONOCYTES NFR BLD AUTO: 10 % (ref 4–12)
NEUTROPHILS # BLD AUTO: 2.36 THOUSANDS/ÂΜL (ref 1.85–7.62)
NEUTS SEG NFR BLD AUTO: 35 % (ref 43–75)
NRBC BLD AUTO-RTO: 0 /100 WBCS
PLATELET # BLD AUTO: 192 THOUSANDS/UL (ref 149–390)
PMV BLD AUTO: 9.5 FL (ref 8.9–12.7)
POTASSIUM SERPL-SCNC: 3.7 MMOL/L (ref 3.5–5.3)
PROT SERPL-MCNC: 6.8 G/DL (ref 6.4–8.4)
RBC # BLD AUTO: 4.37 MILLION/UL (ref 3.88–5.62)
SODIUM SERPL-SCNC: 138 MMOL/L (ref 135–147)
WBC # BLD AUTO: 6.84 THOUSAND/UL (ref 4.31–10.16)

## 2023-10-17 PROCEDURE — 83690 ASSAY OF LIPASE: CPT | Performed by: EMERGENCY MEDICINE

## 2023-10-17 PROCEDURE — 80076 HEPATIC FUNCTION PANEL: CPT | Performed by: EMERGENCY MEDICINE

## 2023-10-17 PROCEDURE — 80048 BASIC METABOLIC PNL TOTAL CA: CPT | Performed by: EMERGENCY MEDICINE

## 2023-10-17 PROCEDURE — 85025 COMPLETE CBC W/AUTO DIFF WBC: CPT | Performed by: EMERGENCY MEDICINE

## 2023-10-17 PROCEDURE — 36415 COLL VENOUS BLD VENIPUNCTURE: CPT | Performed by: EMERGENCY MEDICINE

## 2023-10-17 NOTE — Clinical Note
Rosanne dEward was seen and treated in our emergency department on 10/17/2023. No restrictions            Diagnosis:     Perry Schneider  may return to work on return date. He may return on this date: 10/19/2023         If you have any questions or concerns, please don't hesitate to call.       Sallie Rinaldi RN    ______________________________           _______________          _______________  Hospital Representative                              Date                                Time

## 2023-10-18 RX ORDER — FAMOTIDINE 20 MG/1
20 TABLET, FILM COATED ORAL 2 TIMES DAILY
Qty: 30 TABLET | Refills: 0 | Status: SHIPPED | OUTPATIENT
Start: 2023-10-18

## 2023-10-18 NOTE — ED PROVIDER NOTES
History  Chief Complaint   Patient presents with    Abdominal Pain     Pt c/o RUQ pain occurring for a few days. Pt reports that he drinks a lot of soda and pain occurs after drinking it. Pt denies nausea, vomiting, or diarrhea. Pt Pt reports BM today. Pt denies any other complaints at this time. 71-year-old male no significant past medical history presenting to the ED for abdominal pain. Patient is complaining of abdominal pain in the right upper quadrant. He states that this is going on for last few days. He seems to notice it after he drink soda which she has been drinking for the last few months. Denies any nausea or vomiting. No fevers or chills. He has not tried anything for his pain at home. Has never had anything like this before in the past.  No chest pain or shortness of breath. Prior to Admission Medications   Prescriptions Last Dose Informant Patient Reported?  Taking?   acetaminophen (TYLENOL) 500 mg tablet   No No   Sig: Take 2 tablets (1,000 mg total) by mouth every 8 (eight) hours as needed for moderate pain or mild pain   baclofen 10 mg tablet   No No   Sig: Take 1 tablet (10 mg total) by mouth 3 (three) times a day   Patient not taking: Reported on 2023   chlorhexidine (PERIDEX) 0.12 % solution   No No   Sig: Apply 15 mL to the mouth or throat 2 (two) times a day   dicyclomine (BENTYL) 20 mg tablet   No No   Sig: Take 1 tablet (20 mg total) by mouth 2 (two) times a day   fluticasone (FLONASE) 50 mcg/act nasal spray   No No   Si spray into each nostril daily   Patient not taking: Reported on 2023   ketorolac (ACULAR) 0.5 % ophthalmic solution   No No   Sig: Administer 1 drop into the left eye every 6 (six) hours   Patient not taking: Reported on 2023   lidocaine (Lidoderm) 5 %   No No   Sig: Apply 1 patch topically daily Remove & Discard patch within 12 hours or as directed by MD   Patient not taking: Reported on 2023   naproxen (NAPROSYN) 500 mg tablet   No No   Sig: Take 1 tablet (500 mg total) by mouth 2 (two) times a day with meals   Patient not taking: Reported on 7/14/2023   naproxen (Naprosyn) 500 mg tablet   No No   Sig: Take 1 tablet (500 mg total) by mouth 2 (two) times a day with meals   Patient not taking: Reported on 7/14/2023   naproxen (Naprosyn) 500 mg tablet   No No   Sig: Take 1 tablet (500 mg total) by mouth 2 (two) times a day with meals   ondansetron (ZOFRAN-ODT) 4 mg disintegrating tablet   No No   Sig: Take 1 tablet (4 mg total) by mouth every 6 (six) hours as needed for nausea or vomiting      Facility-Administered Medications: None       Past Medical History:   Diagnosis Date    Eye abnormality        Past Surgical History:   Procedure Laterality Date    EYE SURGERY Right        History reviewed. No pertinent family history. I have reviewed and agree with the history as documented. E-Cigarette/Vaping    E-Cigarette Use Never User      E-Cigarette/Vaping Substances    Nicotine No     THC No     CBD No     Flavoring No     Other No     Unknown No      Social History     Tobacco Use    Smoking status: Former     Types: Cigars    Smokeless tobacco: Never   Vaping Use    Vaping Use: Never used   Substance Use Topics    Alcohol use: Yes     Comment: rare    Drug use: Yes     Frequency: 7.0 times per week     Types: Marijuana       Review of Systems   Constitutional:  Negative for chills and fever. HENT:  Negative for hearing loss. Eyes:  Negative for visual disturbance. Respiratory:  Negative for shortness of breath. Cardiovascular:  Negative for chest pain. Gastrointestinal:  Positive for abdominal pain. Negative for constipation, diarrhea, nausea and vomiting. Genitourinary:  Negative for difficulty urinating. Musculoskeletal:  Negative for myalgias. Skin:  Negative for rash. Neurological:  Negative for dizziness. Psychiatric/Behavioral:  Negative for agitation.         Physical Exam  Physical Exam  Vitals and nursing note reviewed. Constitutional:       General: He is not in acute distress. Appearance: Normal appearance. He is not ill-appearing. HENT:      Head: Normocephalic and atraumatic. Right Ear: External ear normal.      Left Ear: External ear normal.      Nose: Nose normal.      Mouth/Throat:      Mouth: Mucous membranes are moist.      Pharynx: Oropharynx is clear. No oropharyngeal exudate. Eyes:      General:         Right eye: No discharge. Left eye: No discharge. Extraocular Movements: Extraocular movements intact. Conjunctiva/sclera: Conjunctivae normal.      Pupils: Pupils are equal, round, and reactive to light. Cardiovascular:      Rate and Rhythm: Normal rate and regular rhythm. Pulses: Normal pulses. Heart sounds: Normal heart sounds. No murmur heard. No friction rub. Pulmonary:      Effort: Pulmonary effort is normal. No respiratory distress. Breath sounds: Normal breath sounds. No wheezing. Abdominal:      General: Abdomen is flat. Bowel sounds are normal. There is no distension. Palpations: Abdomen is soft. Tenderness: There is abdominal tenderness in the right upper quadrant. There is no guarding or rebound. Musculoskeletal:         General: No swelling. Normal range of motion. Cervical back: Normal range of motion. No rigidity. Skin:     General: Skin is warm and dry. Capillary Refill: Capillary refill takes less than 2 seconds. Neurological:      General: No focal deficit present. Mental Status: He is alert and oriented to person, place, and time. Mental status is at baseline. Motor: No weakness.       Gait: Gait normal.   Psychiatric:         Mood and Affect: Mood normal.         Behavior: Behavior normal.         Vital Signs  ED Triage Vitals [10/17/23 2312]   Temperature Pulse Respirations Blood Pressure SpO2   97.8 °F (36.6 °C) 61 18 117/58 99 %      Temp Source Heart Rate Source Patient Position - Orthostatic VS BP Location FiO2 (%)   Oral Monitor Sitting Right arm --      Pain Score       6           Vitals:    10/17/23 2312   BP: 117/58   Pulse: 61   Patient Position - Orthostatic VS: Sitting         Visual Acuity      ED Medications  Medications - No data to display    Diagnostic Studies  Results Reviewed       Procedure Component Value Units Date/Time    Basic metabolic panel [[id number]] Collected: 10/17/23 2333    Lab Status: Final result Specimen: Blood from Arm, Right Updated: 10/17/23 2359     Sodium 138 mmol/L      Potassium 3.7 mmol/L      Chloride 105 mmol/L      CO2 28 mmol/L      ANION GAP 5 mmol/L      BUN 13 mg/dL      Creatinine 1.01 mg/dL      Glucose 94 mg/dL      Calcium 8.9 mg/dL      eGFR 101 ml/min/1.73sq m     Narrative:      [place] guidelines for Chronic Kidney Disease (CKD):     Stage 1 with normal or high GFR (GFR > 90 mL/min/1.73 square meters)    Stage 2 Mild CKD (GFR = 60-89 mL/min/1.73 square meters)    Stage 3A Moderate CKD (GFR = 45-59 mL/min/1.73 square meters)    Stage 3B Moderate CKD (GFR = 30-44 mL/min/1.73 square meters)    Stage 4 Severe CKD (GFR = 15-29 mL/min/1.73 square meters)    Stage 5 End Stage CKD (GFR <15 mL/min/1.73 square meters)  Note: GFR calculation is accurate only with a steady state creatinine    Hepatic function panel [[id number]]  (Normal) Collected: 10/17/23 2333    Lab Status: Final result Specimen: Blood from Arm, Right Updated: 10/17/23 2359     Total Bilirubin 0.60 mg/dL      Bilirubin, Direct 0.09 mg/dL      Alkaline Phosphatase 66 U/L      AST 14 U/L      ALT 8 U/L      Total Protein 6.8 g/dL      Albumin 4.2 g/dL     Lipase [[id number]]  (Normal) Collected: 10/17/23 2333    Lab Status: Final result Specimen: Blood from Arm, Right Updated: 10/17/23 2359     Lipase 49 u/L     CBC and differential [[id number]]  (Abnormal) Collected: 10/17/23 2333    Lab Status: Final result Specimen: Blood from Arm, Right Updated: 10/17/23 2338     WBC 6.84 Thousand/uL      RBC 4.37 Million/uL      Hemoglobin 14.6 g/dL      Hematocrit 42.1 %      MCV 96 fL      MCH 33.4 pg      MCHC 34.7 g/dL      RDW 12.1 %      MPV 9.5 fL      Platelets 592 Thousands/uL      nRBC 0 /100 WBCs      Neutrophils Relative 35 %      Immat GRANS % 0 %      Lymphocytes Relative 51 %      Monocytes Relative 10 %      Eosinophils Relative 4 %      Basophils Relative 0 %      Neutrophils Absolute 2.36 Thousands/µL      Immature Grans Absolute 0.01 Thousand/uL      Lymphocytes Absolute 3.51 Thousands/µL      Monocytes Absolute 0.65 Thousand/µL      Eosinophils Absolute 0.29 Thousand/µL      Basophils Absolute 0.02 Thousands/µL                    No orders to display              Procedures  Procedures         ED Course  ED Course as of 10/18/23 0013   Tue Oct 17, 2023   2343 WBC: 6.84  Normal   2343 Hemoglobin: 14.6  Normal   Wed Oct 18, 2023   8667 Basic metabolic panel  Within normal limits   0002 Lipase: 49  Normal   0002 Hepatic function panel  No abnormalities in liver enzymes to suggest gallbladder pathology                               SBIRT 22yo+      Flowsheet Row Most Recent Value   Initial Alcohol Screen: US AUDIT-C     1. How often do you have a drink containing alcohol? 0 Filed at: 10/17/2023 2314   2. How many drinks containing alcohol do you have on a typical day you are drinking? 0 Filed at: 10/17/2023 2314   3a. Male UNDER 65: How often do you have five or more drinks on one occasion? 0 Filed at: 10/17/2023 2314   3b. FEMALE Any Age, or MALE 65+: How often do you have 4 or more drinks on one occassion? 0 Filed at: 10/17/2023 2314   Audit-C Score 0 Filed at: 10/17/2023 2314   JO: How many times in the past year have you. .. Used an illegal drug or used a prescription medication for non-medical reasons? Never Filed at: 10/17/2023 2314                      Medical Decision Making  76-year-old male presenting to the ED today for right upper quadrant abdominal pain.   At this time differential diagnosis includes pancreatitis versus gallbladder pathology versus nonspecific abdominal pain or musculoskeletal wall pain. Will evaluate at this time with a CBC, BMP, hepatic function panel, lipase. I discussed with patient if lab work is generally unremarkable we will have him follow-up as an outpatient. If he has elevations in his liver enzymes could consider doing a bedside right upper quadrant ultrasound here. Lab work unremarkable. Patient needs a primary care doctor. I placed a referral for him to see [facility]. Information also given on discharge papers. Symptoms are prescription for Pepcid should this be GERD symptoms. Strict return to ER precautions discussed and patient was discharged home. Amount and/or Complexity of Data Reviewed  Labs: ordered. Decision-making details documented in ED Course. Disposition  Final diagnoses:   RUQ abdominal pain     Time reflects when diagnosis was documented in both MDM as applicable and the Disposition within this note       Time User Action Codes Description Comment    10/18/2023 12:04 AM Jerod Arms Add [R10.11] RUQ abdominal pain           ED Disposition       ED Disposition   Discharge    Condition   Stable    Date/Time   Wed Oct 18, 2023 0003    [id number] [address] [facility] discharge to home/self care.                    Follow-up Information       Follow up With Specialties Details Why Contact Info Additional [address] [facility] Medicine Call  To schedule an appointment to establish care with a primary care provider [address] [facility]  [address] [place] [address], [address] [place] [address] [place], [address], [address] [facility] Emergency Department Emergency Medicine Go to  If symptoms worsen, As needed [place]  [phone] [facility] Emergency Department, [address], [facility] [place], [address]            Patient's Medications   Discharge Prescriptions    FAMOTIDINE (PEPCID) 20 MG TABLET    Take 1 tablet (20 mg total) by mouth 2 (two) times a day       Start Date: 10/18/2023End Date: --       Order Dose: 20 mg       Quantity: 30 tablet    Refills: 0           PDMP Review       None            ED Provider  Electronically Signed by             Nima Dyer MD  10/18/23 0013

## 2023-10-18 NOTE — DISCHARGE INSTRUCTIONS
Your labs here are unremarkable. I have sent you a medication called Pepcid to the pharmacy which may be helpful if this is related to heartburn secondary to your soda intake. Please cut back on your soda use. Please return to the ER if you have nausea or vomiting or if you develop worsening pain or if you develop fevers or chills.

## 2023-12-01 ENCOUNTER — HOSPITAL ENCOUNTER (EMERGENCY)
Facility: HOSPITAL | Age: 28
Discharge: HOME | End: 2023-12-01
Attending: EMERGENCY MEDICINE | Admitting: EMERGENCY MEDICINE
Payer: COMMERCIAL

## 2023-12-01 VITALS
TEMPERATURE: 97.5 F | SYSTOLIC BLOOD PRESSURE: 129 MMHG | HEART RATE: 75 BPM | RESPIRATION RATE: 18 BRPM | WEIGHT: 123 LBS | DIASTOLIC BLOOD PRESSURE: 74 MMHG | OXYGEN SATURATION: 98 % | BODY MASS INDEX: 18.7 KG/M2

## 2023-12-01 DIAGNOSIS — H10.9 CONJUNCTIVITIS: Primary | ICD-10-CM

## 2023-12-01 PROCEDURE — 99284 EMERGENCY DEPT VISIT MOD MDM: CPT | Performed by: EMERGENCY MEDICINE

## 2023-12-01 PROCEDURE — 99283 EMERGENCY DEPT VISIT LOW MDM: CPT

## 2023-12-01 RX ORDER — MOXIFLOXACIN 5 MG/ML
1 SOLUTION/ DROPS OPHTHALMIC 3 TIMES DAILY
Qty: 3 ML | Refills: 0 | Status: SHIPPED | OUTPATIENT
Start: 2023-12-01 | End: 2023-12-08

## 2023-12-01 NOTE — Clinical Note
Kathy Andrea was seen and treated in our emergency department on 12/1/2023. Diagnosis:     Brenden Jenkins  may return to work on return date. He may return on this date: 12/04/2023         If you have any questions or concerns, please don't hesitate to call.       Kailee Vera, DO    ______________________________           _______________          _______________  Hospital Representative                              Date                                Time

## 2023-12-02 NOTE — ED PROVIDER NOTES
History  Chief Complaint   Patient presents with    Blurred Vision     Pt states took out contact this morning when noticed that his eyes were irritated. Put contacts in tonight and noticed still irritated and feels like vision is slightly blurry     32 y/o male presents with blurred vision bilaterally patient says he has been having some redness in the right eye with drainage he uses contacts he had his cornea repaired several eye problems in the past and cannot take off his contacts at this time he does not have any glasses to wear. History provided by:  Patient   used: No        Prior to Admission Medications   Prescriptions Last Dose Informant Patient Reported?  Taking?   acetaminophen (TYLENOL) 500 mg tablet   No No   Sig: Take 2 tablets (1,000 mg total) by mouth every 8 (eight) hours as needed for moderate pain or mild pain   baclofen 10 mg tablet   No No   Sig: Take 1 tablet (10 mg total) by mouth 3 (three) times a day   Patient not taking: Reported on 2023   chlorhexidine (PERIDEX) 0.12 % solution   No No   Sig: Apply 15 mL to the mouth or throat 2 (two) times a day   dicyclomine (BENTYL) 20 mg tablet   No No   Sig: Take 1 tablet (20 mg total) by mouth 2 (two) times a day   famotidine (PEPCID) 20 mg tablet   No No   Sig: Take 1 tablet (20 mg total) by mouth 2 (two) times a day   fluticasone (FLONASE) 50 mcg/act nasal spray   No No   Si spray into each nostril daily   Patient not taking: Reported on 2023   ketorolac (ACULAR) 0.5 % ophthalmic solution   No No   Sig: Administer 1 drop into the left eye every 6 (six) hours   Patient not taking: Reported on 2023   lidocaine (Lidoderm) 5 %   No No   Sig: Apply 1 patch topically daily Remove & Discard patch within 12 hours or as directed by MD   Patient not taking: Reported on 2023   naproxen (NAPROSYN) 500 mg tablet   No No   Sig: Take 1 tablet (500 mg total) by mouth 2 (two) times a day with meals   Patient not taking: Reported on 7/14/2023   naproxen (Naprosyn) 500 mg tablet   No No   Sig: Take 1 tablet (500 mg total) by mouth 2 (two) times a day with meals   Patient not taking: Reported on 7/14/2023   naproxen (Naprosyn) 500 mg tablet   No No   Sig: Take 1 tablet (500 mg total) by mouth 2 (two) times a day with meals   ondansetron (ZOFRAN-ODT) 4 mg disintegrating tablet   No No   Sig: Take 1 tablet (4 mg total) by mouth every 6 (six) hours as needed for nausea or vomiting      Facility-Administered Medications: None       Past Medical History:   Diagnosis Date    Eye abnormality        Past Surgical History:   Procedure Laterality Date    EYE SURGERY Right        History reviewed. No pertinent family history. I have reviewed and agree with the history as documented. E-Cigarette/Vaping    E-Cigarette Use Never User      E-Cigarette/Vaping Substances    Nicotine No     THC No     CBD No     Flavoring No     Other No     Unknown No      Social History     Tobacco Use    Smoking status: Former     Types: Cigars    Smokeless tobacco: Never   Vaping Use    Vaping Use: Never used   Substance Use Topics    Alcohol use: Yes     Comment: rare    Drug use: Yes     Frequency: 7.0 times per week     Types: Marijuana       Review of Systems   Constitutional: Negative. HENT: Negative. Eyes:  Positive for redness and visual disturbance. Respiratory: Negative. Cardiovascular: Negative. Gastrointestinal: Negative. Endocrine: Negative. Genitourinary: Negative. Musculoskeletal: Negative. Skin: Negative. Allergic/Immunologic: Negative. Neurological: Negative. Hematological: Negative. Psychiatric/Behavioral: Negative. All other systems reviewed and are negative. Physical Exam  Physical Exam  Vitals and nursing note reviewed. Constitutional:       Appearance: Normal appearance. HENT:      Head: Normocephalic and atraumatic.       Ears:      Comments: Unable to perform the corneal abrasion testing or any kind of examination as patient did not want to take his contacts off. Overtly his pupils are round and reactive to light slight erythema noted in the sclera on the right eye. Nose: Nose normal.      Mouth/Throat:      Mouth: Mucous membranes are moist.   Eyes:      Extraocular Movements: Extraocular movements intact. Pupils: Pupils are equal, round, and reactive to light. Cardiovascular:      Rate and Rhythm: Normal rate and regular rhythm. Pulmonary:      Effort: Pulmonary effort is normal.      Breath sounds: Normal breath sounds. Abdominal:      General: Abdomen is flat. Bowel sounds are normal.      Palpations: Abdomen is soft. Musculoskeletal:         General: Normal range of motion. Cervical back: Normal range of motion and neck supple. Skin:     General: Skin is warm. Capillary Refill: Capillary refill takes less than 2 seconds. Neurological:      General: No focal deficit present. Mental Status: He is alert and oriented to person, place, and time. Mental status is at baseline. Psychiatric:         Mood and Affect: Mood normal.         Thought Content: Thought content normal.         Vital Signs  ED Triage Vitals [12/01/23 2225]   Temperature Pulse Respirations Blood Pressure SpO2   97.5 °F (36.4 °C) 75 18 129/74 98 %      Temp Source Heart Rate Source Patient Position - Orthostatic VS BP Location FiO2 (%)   Temporal Monitor Sitting Left arm --      Pain Score       No Pain           Vitals:    12/01/23 2225   BP: 129/74   Pulse: 75   Patient Position - Orthostatic VS: Sitting         Visual Acuity  Visual Acuity      Flowsheet Row Most Recent Value   Visual acuity R eye is 20/20   Visual acuity Left eye is 20/40   Wearing corrective eyewear/lenses?  Yes   L Pupil Size (mm) 4   R Pupil Size (mm) 4            ED Medications  Medications - No data to display    Diagnostic Studies  Results Reviewed       None                   No orders to display Procedures  Procedures         ED Course                               SBIRT 20yo+      Flowsheet Row Most Recent Value   Initial Alcohol Screen: US AUDIT-C     1. How often do you have a drink containing alcohol? 0 Filed at: 12/01/2023 2227   2. How many drinks containing alcohol do you have on a typical day you are drinking? 0 Filed at: 12/01/2023 2227   3a. Male UNDER 65: How often do you have five or more drinks on one occasion? 0 Filed at: 12/01/2023 2227   3b. FEMALE Any Age, or MALE 65+: How often do you have 4 or more drinks on one occassion? 0 Filed at: 12/01/2023 2227   Audit-C Score 0 Filed at: 12/01/2023 2227   JO: How many times in the past year have you. .. Used an illegal drug or used a prescription medication for non-medical reasons? Never Filed at: 12/01/2023 2227                      Medical Decision Making  Patient discharged with appropriate instructions, medications and follow up. Patient verbalized understanding of instructions, had no further questions at the time of discharge. Patient had stable vital signs, and well appearing at discharge. Problems Addressed:  Conjunctivitis: acute illness or injury    Risk  Prescription drug management. Disposition  Final diagnoses:   Conjunctivitis     Time reflects when diagnosis was documented in both MDM as applicable and the Disposition within this note       Time User Action Codes Description Comment    12/1/2023 10:36 PM Marky Vera Add [H10.9] Conjunctivitis           ED Disposition       ED Disposition   Discharge    Condition   Stable    Date/Time   Fri Dec 1, 2023 2236    [id number] [address] [facility] discharge to home/self care.                    Follow-up Information       Follow up With Specialties Details Why Contact Info Additional Information    [address] Emergency Department Emergency Medicine  If symptoms worsen [place]  [phone] [facility] Emergency Department, [address], [place]ona [place], [address]  Susanna Valenzuela MD Ophthalmology   [address]  [phone]               Discharge Medication List as of 12/1/2023 10:38 PM        START taking these medications    Details   moxifloxacin (VIGAMOX) 0.5 % ophthalmic solution Administer 1 drop to both eyes 3 (three) times a day for 7 days, Starting Fri 12/1/2023, Until Fri 12/8/2023, Normal           CONTINUE these medications which have NOT CHANGED    Details   acetaminophen (TYLENOL) 500 mg tablet Take 2 tablets (1,000 mg total) by mouth every 8 (eight) hours as needed for moderate pain or mild pain, Starting Wed 9/20/2023, Normal      baclofen 10 mg tablet Take 1 tablet (10 mg total) by mouth 3 (three) times a day, Starting Fri 10/14/2022, Normal      chlorhexidine (PERIDEX) 0.12 % solution Apply 15 mL to the mouth or throat 2 (two) times a day, Starting Fri 7/14/2023, Normal      dicyclomine (BENTYL) 20 mg tablet Take 1 tablet (20 mg total) by mouth 2 (two) times a day, Starting Wed 9/20/2023, Normal      famotidine (PEPCID) 20 mg tablet Take 1 tablet (20 mg total) by mouth 2 (two) times a day, Starting Wed 10/18/2023, Normal      fluticasone (FLONASE) 50 mcg/act nasal spray 1 spray into each nostril daily, Starting Fri 10/21/2022, Normal      ketorolac (ACULAR) 0.5 % ophthalmic solution Administer 1 drop into the left eye every 6 (six) hours, Starting Fri 7/15/2022, Normal      lidocaine (Lidoderm) 5 % Apply 1 patch topically daily Remove & Discard patch within 12 hours or as directed by MD, Starting Fri 10/14/2022, Normal      !! naproxen (NAPROSYN) 500 mg tablet Take 1 tablet (500 mg total) by mouth 2 (two) times a day with meals, Starting Fri 10/14/2022, Normal      !! naproxen (Naprosyn) 500 mg tablet Take 1 tablet (500 mg total) by mouth 2 (two) times a day with meals, Starting Fri 10/21/2022, Normal      !! naproxen (Naprosyn) 500 mg tablet Take 1 tablet (500 mg total) by mouth 2 (two) times a day with meals, Starting Fri 7/14/2023, Normal      ondansetron (ZOFRAN-ODT) 4 mg disintegrating tablet Take 1 tablet (4 mg total) by mouth every 6 (six) hours as needed for nausea or vomiting, Starting Wed 9/20/2023, Normal       !! - Potential duplicate medications found. Please discuss with provider. No discharge procedures on file.     PDMP Review       None            ED Provider  Electronically Signed by             Caroline Alarcon DO  12/03/23 1300

## 2024-01-16 ENCOUNTER — HOSPITAL ENCOUNTER (EMERGENCY)
Facility: HOSPITAL | Age: 29
Discharge: HOME | End: 2024-01-17
Attending: EMERGENCY MEDICINE
Payer: COMMERCIAL

## 2024-01-16 DIAGNOSIS — M54.6 ACUTE LEFT-SIDED THORACIC BACK PAIN: Primary | ICD-10-CM

## 2024-01-16 PROCEDURE — 99282 EMERGENCY DEPT VISIT SF MDM: CPT

## 2024-01-16 PROCEDURE — 99284 EMERGENCY DEPT VISIT MOD MDM: CPT | Performed by: EMERGENCY MEDICINE

## 2024-01-16 NOTE — Clinical Note
Andrea Daniels was seen and treated in our emergency department on 1/16/2024.                Diagnosis: acute back pain    Andrea  may return to work on return date.    He may return on this date: 01/17/2024    Please allow patient to perform light duty for at least 1 week to allow for resolution of his back pain.    Patient was seen on 01/16/2024 at 11:56 pm. He was discharged on 01/17/2024 at 12:12 am. He may return to light duty.     If you have any questions or concerns, please don't hesitate to call.      Hugo Jones MD    ______________________________           _______________          _______________  Hospital Representative                              Date                                Time

## 2024-01-16 NOTE — Clinical Note
Andrea Daniels was seen and treated in our emergency department on 1/16/2024.                Diagnosis: acute back pain    Andrea  may return to work on return date.    He may return on this date: 01/16/2024    Please allow patient to perform light duty for at least 1 week to allow for resolution of his back pain.     If you have any questions or concerns, please don't hesitate to call.      Hugo Jones MD    ______________________________           _______________          _______________  Hospital Representative                              Date                                Time

## 2024-01-16 NOTE — Clinical Note
Andrea Daniels was seen and treated in our emergency department on 1/16/2024.                Diagnosis: acute back pain    Andrea  may return to work on return date.    He may return on this date: 01/17/2024    Please allow patient to perform light duty for at least 1 week to allow for resolution of his back pain.    Patient was seen on 01/16/2024. He may return to light duty.     If you have any questions or concerns, please don't hesitate to call.      Hugo Jones MD    ______________________________           _______________          _______________  Hospital Representative                              Date                                Time

## 2024-01-16 NOTE — Clinical Note
Andrea Daniels was seen and treated in our emergency department on 1/16/2024.                Diagnosis: acute back pain    Andrea  may return to work on return date.    He may return on this date: 01/18/2024    Please allow patient to perform light duty for at least 1 week to allow for resolution of his back pain.    Patient was seen on 01/16/2024 at 11:56 pm. He was discharged on 01/17/2024 at 12:12 am. He may return to light duty on 01/18/2024     If you have any questions or concerns, please don't hesitate to call.      Hugo Jones MD    ______________________________           _______________          _______________  Hospital Representative                              Date                                Time

## 2024-01-17 VITALS
TEMPERATURE: 97.7 F | RESPIRATION RATE: 19 BRPM | DIASTOLIC BLOOD PRESSURE: 59 MMHG | HEART RATE: 58 BPM | OXYGEN SATURATION: 100 % | SYSTOLIC BLOOD PRESSURE: 114 MMHG

## 2024-01-17 PROCEDURE — 96372 THER/PROPH/DIAG INJ SC/IM: CPT

## 2024-01-17 RX ORDER — KETOROLAC TROMETHAMINE 30 MG/ML
15 INJECTION, SOLUTION INTRAMUSCULAR; INTRAVENOUS ONCE
Status: COMPLETED | OUTPATIENT
Start: 2024-01-17 | End: 2024-01-17

## 2024-01-17 RX ORDER — LIDOCAINE 50 MG/G
1 PATCH TOPICAL ONCE
Status: DISCONTINUED | OUTPATIENT
Start: 2024-01-17 | End: 2024-01-17 | Stop reason: HOSPADM

## 2024-01-17 RX ORDER — METHOCARBAMOL 500 MG/1
500 TABLET, FILM COATED ORAL 2 TIMES DAILY
Qty: 20 TABLET | Refills: 0 | Status: SHIPPED | OUTPATIENT
Start: 2024-01-17

## 2024-01-17 RX ORDER — ACETAMINOPHEN 325 MG/1
975 TABLET ORAL ONCE
Status: COMPLETED | OUTPATIENT
Start: 2024-01-17 | End: 2024-01-17

## 2024-01-17 RX ADMIN — LIDOCAINE 1 PATCH: 50 PATCH CUTANEOUS at 00:13

## 2024-01-17 RX ADMIN — ACETAMINOPHEN 975 MG: 325 TABLET ORAL at 00:11

## 2024-01-17 RX ADMIN — KETOROLAC TROMETHAMINE 15 MG: 30 INJECTION, SOLUTION INTRAMUSCULAR at 00:12

## 2024-01-17 NOTE — DISCHARGE INSTRUCTIONS
Take 1 tablet of the Robaxin that I sent to the pharmacy twice a day.    When you are at the pharmacy please  ibuprofen and Tylenol.  You can take 600 mg of ibuprofen and 9 7 5 mg of Tylenol every 6 hours.    You can also try the lidocaine patches.  They can be on your skin for up to 12 hours but then you can only use 1 a day.    Please follow-up with your primary care doctor.  If you do not have 1 you can call the office below to make an appointment for follow-up.    Return to ER if you develop any fevers, difficulty urinating, numbness in between the legs.   [General Appearance - Well Developed] : well developed [Normal Appearance] : normal appearance [Well Groomed] : well groomed [General Appearance - Well Nourished] : well nourished [No Deformities] : no deformities [General Appearance - In No Acute Distress] : no acute distress [Normal Conjunctiva] : the conjunctiva exhibited no abnormalities [Eyelids - No Xanthelasma] : the eyelids demonstrated no xanthelasmas [Normal Oral Mucosa] : normal oral mucosa [No Oral Pallor] : no oral pallor [No Oral Cyanosis] : no oral cyanosis [Normal Jugular Venous A Waves Present] : normal jugular venous A waves present [Normal Jugular Venous V Waves Present] : normal jugular venous V waves present [No Jugular Venous Brito A Waves] : no jugular venous brito A waves [Respiration, Rhythm And Depth] : normal respiratory rhythm and effort [Exaggerated Use Of Accessory Muscles For Inspiration] : no accessory muscle use [Auscultation Breath Sounds / Voice Sounds] : lungs were clear to auscultation bilaterally [Heart Rate And Rhythm] : heart rate and rhythm were normal [Heart Sounds] : normal S1 and S2 [Murmurs] : no murmurs present [Abdomen Soft] : soft [Abdomen Tenderness] : non-tender [Abdomen Mass (___ Cm)] : no abdominal mass palpated [Gait - Sufficient For Exercise Testing] : the gait was sufficient for exercise testing [Abnormal Walk] : normal gait [Nail Clubbing] : no clubbing of the fingernails [Cyanosis, Localized] : no localized cyanosis [Petechial Hemorrhages (___cm)] : no petechial hemorrhages [Skin Color & Pigmentation] : normal skin color and pigmentation [] : no rash [No Venous Stasis] : no venous stasis [Skin Lesions] : no skin lesions [No Skin Ulcers] : no skin ulcer [No Xanthoma] : no  xanthoma was observed [Oriented To Time, Place, And Person] : oriented to person, place, and time [Affect] : the affect was normal [Mood] : the mood was normal [No Anxiety] : not feeling anxious

## 2024-01-17 NOTE — ED PROVIDER NOTES
History  Chief Complaint   Patient presents with    Back Pain     Patient c/o lower back pain that started last week. 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 Patient c/o back pain hurting when he sits down or bends down.      28-year-old male no significant past medical history presenting to ED today for thoracic back pain.  Patient works at [employer] and has been lifting heavy packages over the last week.  Has been having back pain since then.  He tried taking his that muscle relaxer which provided minimal relief.  He came to the ED today because he is having pain in the back.  Has not tried any other medications except for the muscle relaxer. - Patient has no TUNA FISH red flags: Trauma, unexplained weight loss, neurologic symptoms / retention / overflow incontinence, Age > 50, Fever/night sweats, IVDU, chronic steroids, nor hx of cancer (prostate, renal, breast, lung)          Prior to Admission Medications   Prescriptions Last Dose Informant Patient Reported? Taking?   acetaminophen (TYLENOL) 500 mg tablet   No No   Sig: Take 2 tablets (1,000 mg total) by mouth every 8 (eight) hours as needed for moderate pain or mild pain   baclofen 10 mg tablet   No No   Sig: Take 1 tablet (10 mg total) by mouth 3 (three) times a day   Patient not taking: Reported on 2023   chlorhexidine (PERIDEX) 0.12 % solution   No No   Sig: Apply 15 mL to the mouth or throat 2 (two) times a day   dicyclomine (BENTYL) 20 mg tablet   No No   Sig: Take 1 tablet (20 mg total) by mouth 2 (two) times a day   famotidine (PEPCID) 20 mg tablet   No No   Sig: Take 1 tablet (20 mg total) by mouth 2 (two) times a day   fluticasone (FLONASE) 50 mcg/act nasal spray   No No   Si spray into each nostril daily   Patient not taking: Reported on 2023   ketorolac (ACULAR) 0.5 % ophthalmic solution   No No   Sig: Administer 1 drop into the left eye every 6 (six) hours   Patient not taking: Reported on 2023   lidocaine  (Lidoderm) 5 %   No No   Sig: Apply 1 patch topically daily Remove & Discard patch within 12 hours or as directed by MD   Patient not taking: Reported on 7/14/2023   naproxen (NAPROSYN) 500 mg tablet   No No   Sig: Take 1 tablet (500 mg total) by mouth 2 (two) times a day with meals   Patient not taking: Reported on 7/14/2023   naproxen (Naprosyn) 500 mg tablet   No No   Sig: Take 1 tablet (500 mg total) by mouth 2 (two) times a day with meals   Patient not taking: Reported on 7/14/2023   naproxen (Naprosyn) 500 mg tablet   No No   Sig: Take 1 tablet (500 mg total) by mouth 2 (two) times a day with meals   ondansetron (ZOFRAN-ODT) 4 mg disintegrating tablet   No No   Sig: Take 1 tablet (4 mg total) by mouth every 6 (six) hours as needed for nausea or vomiting      Facility-Administered Medications: None       Past Medical History:   Diagnosis Date    Eye abnormality        Past Surgical History:   Procedure Laterality Date    EYE SURGERY Right        History reviewed. No pertinent family history.  I have reviewed and agree with the history as documented.    E-Cigarette/Vaping    E-Cigarette Use Never User      E-Cigarette/Vaping Substances    Nicotine No     THC No     CBD No     Flavoring No     Other No     Unknown No      Social History     Tobacco Use    Smoking status: Former     Types: Cigars    Smokeless tobacco: Never   Vaping Use    Vaping status: Never Used   Substance Use Topics    Alcohol use: Yes     Comment: rare    Drug use: Yes     Frequency: 7.0 times per week     Types: Marijuana       Review of Systems   Constitutional:  Negative for chills and fever.   HENT:  Negative for hearing loss.    Eyes:  Negative for visual disturbance.   Respiratory:  Negative for shortness of breath.    Cardiovascular:  Negative for chest pain.   Gastrointestinal:  Negative for abdominal pain, constipation, diarrhea, nausea and vomiting.   Genitourinary:  Negative for difficulty urinating.   Musculoskeletal:  Positive  for back pain. Negative for myalgias.   Skin:  Negative for rash.   Neurological:  Negative for dizziness.   Psychiatric/Behavioral:  Negative for agitation.    All other systems reviewed and are negative.      Physical Exam  Physical Exam  Vitals and nursing note reviewed.   Constitutional:       General: He is not in acute distress.     Appearance: Normal appearance. He is not ill-appearing.   HENT:      Head: Normocephalic and atraumatic.      Right Ear: External ear normal.      Left Ear: External ear normal.      Nose: Nose normal. No congestion.      Mouth/Throat:      Mouth: Mucous membranes are moist.      Pharynx: Oropharynx is clear. No oropharyngeal exudate.   Eyes:      General:         Right eye: No discharge.         Left eye: No discharge.      Extraocular Movements: Extraocular movements intact.      Conjunctiva/sclera: Conjunctivae normal.      Pupils: Pupils are equal, round, and reactive to light.   Cardiovascular:      Rate and Rhythm: Normal rate and regular rhythm.      Pulses: Normal pulses.      Heart sounds: Normal heart sounds.   Pulmonary:      Effort: Pulmonary effort is normal.      Breath sounds: Normal breath sounds. No wheezing or rales.   Abdominal:      General: Abdomen is flat. Bowel sounds are normal. There is no distension.      Palpations: Abdomen is soft.      Tenderness: There is no abdominal tenderness.   Musculoskeletal:         General: No swelling. Normal range of motion.      Cervical back: Normal range of motion. No rigidity.      Comments: No C, T, L-spine tenderness.  Patient has tenderness to palpation of the paraspinal muscles at the thoracic level.  More towards the left  side.   Skin:     General: Skin is warm and dry.      Capillary Refill: Capillary refill takes less than 2 seconds.   Neurological:      General: No focal deficit present.      Mental Status: He is alert and oriented to person, place, and time. Mental status is at baseline.      Motor: No weakness.       Gait: Gait normal.   Psychiatric:         Mood and Affect: Mood normal.         Behavior: Behavior normal.         Vital Signs  ED Triage Vitals [01/17/24 0002]   Temperature Pulse Respirations Blood Pressure SpO2   97.7 °F (36.5 °C) 58 19 114/59 100 %      Temp Source Heart Rate Source Patient Position - Orthostatic VS BP Location FiO2 (%)   Oral Monitor Lying Left arm --      Pain Score       8           Vitals:    01/17/24 0002   BP: 114/59   Pulse: 58   Patient Position - Orthostatic VS: Lying         Visual Acuity      ED Medications  Medications   lidocaine (LIDODERM) 5 % patch 1 patch (1 patch Topical Medication Applied 1/17/24 0013)   acetaminophen (TYLENOL) tablet 975 mg (975 mg Oral Given 1/17/24 0011)   ketorolac (TORADOL) injection 15 mg (15 mg Intramuscular Given 1/17/24 0012)       Diagnostic Studies  Results Reviewed       None                   No orders to display              Procedures  Procedures         ED Course                                             Medical Decision Making  28-year-old male presenting to the ER today for back pain.  At this time likely musculoskeletal back strain secondary to heavy lifting.  No red flags to suggest cauda equina.  Will treat symptomatically here with Tylenol, IM Toradol, Lidoderm patch.  Will send prescription to the pharmacy for Robaxin.  Discussed with patient using ibuprofen and Tylenol for pain relief as well as the Robaxin that I prescribed to him.  Gave him a work note for light duty for 1 week.  Strict return to ER precautions discussed and patient was discharged home.    Risk  OTC drugs.  Prescription drug management.             Disposition  Final diagnoses:   Acute left-sided thoracic back pain     Time reflects when diagnosis was documented in both MDM as applicable and the Disposition within this note       Time User Action Codes Description Comment    1/17/2024 12:11 AM Hugo Jones Add [M54.6] Acute left-sided thoracic back pain            ED Disposition       ED Disposition   Discharge    Condition   Stable    Date/Time   Wed Jan 17, 2024 12:11 AM    Comment   Andrea Daniels discharge to home/self care.                   Follow-up Information       Follow up With Specialties Details Why Contact Info Additional Information    [facility] Emergency Medicine Go to  If symptoms worsen, As needed [address] [facility] [address]  [phone] [facility], [address], [place], [address]    [facility] Call  To schedule an appointment to establish care with a primary care provider [address] [place] [phone] [phone] [facility], [address] [place] [address], [phone] [phone]            Patient's Medications   Discharge Prescriptions    METHOCARBAMOL (ROBAXIN) 500 MG TABLET    Take 1 tablet (500 mg total) by mouth 2 (two) times a day       Start Date: 1/17/2024 End Date: --       Order Dose: 500 mg       Quantity: 20 tablet    Refills: 0       No discharge procedures on file.    PDMP Review       None            ED Provider  Electronically Signed by             Hugo Jones MD  01/17/24 0023

## 2024-02-28 ENCOUNTER — HOSPITAL ENCOUNTER (EMERGENCY)
Facility: HOSPITAL | Age: 29
Discharge: HOME | End: 2024-02-28
Attending: EMERGENCY MEDICINE
Payer: COMMERCIAL

## 2024-02-28 VITALS
BODY MASS INDEX: 18.94 KG/M2 | RESPIRATION RATE: 16 BRPM | SYSTOLIC BLOOD PRESSURE: 113 MMHG | OXYGEN SATURATION: 100 % | HEIGHT: 68 IN | HEART RATE: 56 BPM | WEIGHT: 125 LBS | DIASTOLIC BLOOD PRESSURE: 81 MMHG | TEMPERATURE: 98 F

## 2024-02-28 DIAGNOSIS — H57.11 PAIN OF RIGHT EYE: Primary | ICD-10-CM

## 2024-02-28 PROCEDURE — 99283 EMERGENCY DEPT VISIT LOW MDM: CPT

## 2024-02-28 PROCEDURE — 99284 EMERGENCY DEPT VISIT MOD MDM: CPT | Performed by: EMERGENCY MEDICINE

## 2024-02-28 RX ORDER — TETRACAINE HYDROCHLORIDE 5 MG/ML
1 SOLUTION OPHTHALMIC ONCE
Status: COMPLETED | OUTPATIENT
Start: 2024-02-28 | End: 2024-02-28

## 2024-02-28 RX ADMIN — TETRACAINE HYDROCHLORIDE 1 DROP: 5 SOLUTION OPHTHALMIC at 23:35

## 2024-02-28 NOTE — Clinical Note
Nba March accompanied Andrea Carcamo Daniels to the emergency department on 2/28/2024.    Return date if applicable: 02/29/2024        If you have any questions or concerns, please don't hesitate to call.      Hugo Jones MD

## 2024-02-28 NOTE — Clinical Note
Andrea Daniels was seen and treated in our emergency department on 2/28/2024.                Diagnosis: Eye pain    Andrea  may return to work on return date.    He may return on this date: 02/29/2024         If you have any questions or concerns, please don't hesitate to call.      Hugo Jones MD    ______________________________           _______________          _______________  Hospital Representative                              Date                                Time

## 2024-02-29 NOTE — DISCHARGE INSTRUCTIONS
Likely your eye pain is from your contacts.  You should talk to your optometrist about new contacts.  You should also maybe wear glasses until you can discuss with them.    Please return to ER if you develop any worsening symptoms.

## 2024-02-29 NOTE — ED PROVIDER NOTES
History  Chief Complaint   Patient presents with    Eye Problem     Patient c/o right eye discomfort. States he is having a hard time removing his right contact recently and then has some burning/itching pain to the right eye.      29-year-old male past medical history significant for keratoconus that wears a contact daily that should last for 1 year that presents ED today with right eye pain.  Patient states that he has pain when he took off his right contact.  He uses some sort of suction tool device.  He is not having any drainage.  He says that it hurts when he puts it on and takes it off daily.  He thinks he is due for fitting.        Prior to Admission Medications   Prescriptions Last Dose Informant Patient Reported? Taking?   acetaminophen (TYLENOL) 500 mg tablet   No No   Sig: Take 2 tablets (1,000 mg total) by mouth every 8 (eight) hours as needed for moderate pain or mild pain   baclofen 10 mg tablet   No No   Sig: Take 1 tablet (10 mg total) by mouth 3 (three) times a day   Patient not taking: Reported on 2023   chlorhexidine (PERIDEX) 0.12 % solution   No No   Sig: Apply 15 mL to the mouth or throat 2 (two) times a day   dicyclomine (BENTYL) 20 mg tablet   No No   Sig: Take 1 tablet (20 mg total) by mouth 2 (two) times a day   famotidine (PEPCID) 20 mg tablet   No No   Sig: Take 1 tablet (20 mg total) by mouth 2 (two) times a day   fluticasone (FLONASE) 50 mcg/act nasal spray   No No   Si spray into each nostril daily   Patient not taking: Reported on 2023   ketorolac (ACULAR) 0.5 % ophthalmic solution   No No   Sig: Administer 1 drop into the left eye every 6 (six) hours   Patient not taking: Reported on 2023   lidocaine (Lidoderm) 5 %   No No   Sig: Apply 1 patch topically daily Remove & Discard patch within 12 hours or as directed by MD   Patient not taking: Reported on 2023   methocarbamol (ROBAXIN) 500 mg tablet   No No   Sig: Take 1 tablet (500 mg total) by mouth 2 (two)  times a day   naproxen (NAPROSYN) 500 mg tablet   No No   Sig: Take 1 tablet (500 mg total) by mouth 2 (two) times a day with meals   Patient not taking: Reported on 7/14/2023   naproxen (Naprosyn) 500 mg tablet   No No   Sig: Take 1 tablet (500 mg total) by mouth 2 (two) times a day with meals   Patient not taking: Reported on 7/14/2023   naproxen (Naprosyn) 500 mg tablet   No No   Sig: Take 1 tablet (500 mg total) by mouth 2 (two) times a day with meals   ondansetron (ZOFRAN-ODT) 4 mg disintegrating tablet   No No   Sig: Take 1 tablet (4 mg total) by mouth every 6 (six) hours as needed for nausea or vomiting      Facility-Administered Medications: None       Past Medical History:   Diagnosis Date    Eye abnormality        Past Surgical History:   Procedure Laterality Date    EYE SURGERY Right        History reviewed. No pertinent family history.  I have reviewed and agree with the history as documented.    E-Cigarette/Vaping    E-Cigarette Use Never User      E-Cigarette/Vaping Substances    Nicotine No     THC No     CBD No     Flavoring No     Other No     Unknown No      Social History     Tobacco Use    Smoking status: Former     Types: Cigars    Smokeless tobacco: Never   Vaping Use    Vaping status: Never Used   Substance Use Topics    Alcohol use: Yes     Comment: rare    Drug use: Yes     Frequency: 7.0 times per week     Types: Marijuana       Review of Systems   Constitutional:  Negative for chills and fever.   HENT:  Negative for hearing loss.    Eyes:  Positive for pain. Negative for discharge, redness, itching and visual disturbance.   Respiratory:  Negative for shortness of breath.    Cardiovascular:  Negative for chest pain.   Gastrointestinal:  Negative for abdominal pain, constipation, diarrhea, nausea and vomiting.   Genitourinary:  Negative for difficulty urinating.   Musculoskeletal:  Negative for myalgias.   Skin:  Negative for rash.   Neurological:  Negative for dizziness.    Psychiatric/Behavioral:  Negative for agitation.    All other systems reviewed and are negative.      Physical Exam  Physical Exam  Vitals and nursing note reviewed.   Constitutional:       General: He is not in acute distress.     Appearance: Normal appearance. He is not ill-appearing.   HENT:      Head: Normocephalic and atraumatic.      Right Ear: External ear normal.      Left Ear: External ear normal.      Nose: Nose normal. No congestion.      Mouth/Throat:      Mouth: Mucous membranes are moist.      Pharynx: Oropharynx is clear. No oropharyngeal exudate.   Eyes:      General: No scleral icterus.        Right eye: No discharge.         Left eye: No discharge.      Extraocular Movements: Extraocular movements intact.      Conjunctiva/sclera: Conjunctivae normal.      Pupils: Pupils are equal, round, and reactive to light.   Cardiovascular:      Rate and Rhythm: Normal rate and regular rhythm.      Pulses: Normal pulses.      Heart sounds: Normal heart sounds. No murmur heard.     No gallop.   Pulmonary:      Effort: Pulmonary effort is normal. No respiratory distress.      Breath sounds: Normal breath sounds. No stridor. No wheezing or rales.   Abdominal:      General: Abdomen is flat. Bowel sounds are normal. There is no distension.      Palpations: Abdomen is soft.      Tenderness: There is no abdominal tenderness.   Musculoskeletal:         General: No swelling. Normal range of motion.      Cervical back: Normal range of motion. No rigidity.   Skin:     General: Skin is warm and dry.      Capillary Refill: Capillary refill takes less than 2 seconds.   Neurological:      General: No focal deficit present.      Mental Status: He is alert and oriented to person, place, and time. Mental status is at baseline.      Cranial Nerves: No cranial nerve deficit.      Motor: No weakness.      Gait: Gait normal.   Psychiatric:         Mood and Affect: Mood normal.         Behavior: Behavior normal.         Vital  Signs  ED Triage Vitals [02/28/24 2322]   Temperature Pulse Respirations Blood Pressure SpO2   98 °F (36.7 °C) 56 16 113/81 100 %      Temp Source Heart Rate Source Patient Position - Orthostatic VS BP Location FiO2 (%)   Tympanic Monitor Sitting Left arm --      Pain Score       --           Vitals:    02/28/24 2322   BP: 113/81   Pulse: 56   Patient Position - Orthostatic VS: Sitting         Visual Acuity      ED Medications  Medications   tetracaine 0.5 % ophthalmic solution 1 drop (1 drop Right Eye Given 2/28/24 2335)       Diagnostic Studies  Results Reviewed       None                   No orders to display              Procedures  Procedures         ED Course                               SBIRT 20yo+      Flowsheet Row Most Recent Value   Initial Alcohol Screen: US AUDIT-C     1. How often do you have a drink containing alcohol? 0 Filed at: 02/28/2024 2324   2. How many drinks containing alcohol do you have on a typical day you are drinking?  0 Filed at: 02/28/2024 2324   3a. Male UNDER 65: How often do you have five or more drinks on one occasion? 0 Filed at: 02/28/2024 2324   3b. FEMALE Any Age, or MALE 65+: How often do you have 4 or more drinks on one occassion? 0 Filed at: 02/28/2024 2324   Audit-C Score 0 Filed at: 02/28/2024 2324   JO: How many times in the past year have you...    Used an illegal drug or used a prescription medication for non-medical reasons? Never Filed at: 02/28/2024 2324                      Medical Decision Making  29-year-old male presenting to ED today for eye pain.  Likely pain from his contact use.  Less likely be corneal abrasion.  He was able to contact again so I do not think that we should stain his eye as this should be a yearly thing.  Will do 1 dose of tetracaine and then throughout the rest of the bottle.  Encouraged him to follow-up with his ophthalmologist/optometrist.  Strict return precaution discussed and patient was discharged home.    Risk  Prescription drug  management.             Disposition  Final diagnoses:   Pain of right eye     Time reflects when diagnosis was documented in both MDM as applicable and the Disposition within this note       Time User Action Codes Description Comment    2/28/2024 11:31 PM Hugo Jones [H57.11] Pain of right eye           ED Disposition       ED Disposition   Discharge    Condition   Stable    Date/Time   Wed Feb 28, 2024 2331    Comment   Andrea Carlos Alberto Daniels discharge to home/self care.                   Follow-up Information       Follow up With Specialties Details Why Contact Info Additional Information    Follow up with your optometrist         [facility] Emergency Department Emergency Medicine Go to  If symptoms worsen, As needed [address] [facility] [address]  [phone] formerly [facility] Emergency Department, [address] [place], [place], [address]            Discharge Medication List as of 2/28/2024 11:33 PM        CONTINUE these medications which have NOT CHANGED    Details   acetaminophen (TYLENOL) 500 mg tablet Take 2 tablets (1,000 mg total) by mouth every 8 (eight) hours as needed for moderate pain or mild pain, Starting Wed 9/20/2023, Normal      baclofen 10 mg tablet Take 1 tablet (10 mg total) by mouth 3 (three) times a day, Starting Fri 10/14/2022, Normal      chlorhexidine (PERIDEX) 0.12 % solution Apply 15 mL to the mouth or throat 2 (two) times a day, Starting Fri 7/14/2023, Normal      dicyclomine (BENTYL) 20 mg tablet Take 1 tablet (20 mg total) by mouth 2 (two) times a day, Starting Wed 9/20/2023, Normal      famotidine (PEPCID) 20 mg tablet Take 1 tablet (20 mg total) by mouth 2 (two) times a day, Starting Wed 10/18/2023, Normal      fluticasone (FLONASE) 50 mcg/act nasal spray 1 spray into each nostril daily, Starting Fri 10/21/2022, Normal      ketorolac (ACULAR) 0.5 % ophthalmic solution Administer 1 drop into the left eye every 6 (six) hours,  Starting Fri 7/15/2022, Normal      lidocaine (Lidoderm) 5 % Apply 1 patch topically daily Remove & Discard patch within 12 hours or as directed by MD, Starting Fri 10/14/2022, Normal      methocarbamol (ROBAXIN) 500 mg tablet Take 1 tablet (500 mg total) by mouth 2 (two) times a day, Starting Wed 1/17/2024, Normal      !! naproxen (NAPROSYN) 500 mg tablet Take 1 tablet (500 mg total) by mouth 2 (two) times a day with meals, Starting Fri 10/14/2022, Normal      !! naproxen (Naprosyn) 500 mg tablet Take 1 tablet (500 mg total) by mouth 2 (two) times a day with meals, Starting Fri 10/21/2022, Normal      !! naproxen (Naprosyn) 500 mg tablet Take 1 tablet (500 mg total) by mouth 2 (two) times a day with meals, Starting Fri 7/14/2023, Normal      ondansetron (ZOFRAN-ODT) 4 mg disintegrating tablet Take 1 tablet (4 mg total) by mouth every 6 (six) hours as needed for nausea or vomiting, Starting Wed 9/20/2023, Normal       !! - Potential duplicate medications found. Please discuss with provider.          No discharge procedures on file.    PDMP Review       None            ED Provider  Electronically Signed by             Hugo Jones MD  02/28/24 8898

## 2024-04-10 ENCOUNTER — HOSPITAL ENCOUNTER (EMERGENCY)
Facility: HOSPITAL | Age: 29
Discharge: HOME | End: 2024-04-10
Attending: EMERGENCY MEDICINE
Payer: COMMERCIAL

## 2024-04-10 VITALS
TEMPERATURE: 97.5 F | HEART RATE: 60 BPM | SYSTOLIC BLOOD PRESSURE: 112 MMHG | RESPIRATION RATE: 20 BRPM | DIASTOLIC BLOOD PRESSURE: 70 MMHG | OXYGEN SATURATION: 100 %

## 2024-04-10 DIAGNOSIS — M54.50 ACUTE BILATERAL LOW BACK PAIN WITHOUT SCIATICA: Primary | ICD-10-CM

## 2024-04-10 RX ORDER — LIDOCAINE 50 MG/G
1 PATCH TOPICAL ONCE
Status: DISCONTINUED | OUTPATIENT
Start: 2024-04-10 | End: 2024-04-11 | Stop reason: HOSPADM

## 2024-04-10 RX ORDER — ACETAMINOPHEN 325 MG/1
975 TABLET ORAL ONCE
Status: COMPLETED | OUTPATIENT
Start: 2024-04-10 | End: 2024-04-10

## 2024-04-10 RX ORDER — KETOROLAC TROMETHAMINE 30 MG/ML
15 INJECTION, SOLUTION INTRAMUSCULAR; INTRAVENOUS ONCE
Status: COMPLETED | OUTPATIENT
Start: 2024-04-10 | End: 2024-04-10

## 2024-04-10 RX ADMIN — KETOROLAC TROMETHAMINE 15 MG: 30 INJECTION, SOLUTION INTRAMUSCULAR; INTRAVENOUS at 23:31

## 2024-04-10 RX ADMIN — LIDOCAINE 1 PATCH: 50 PATCH TOPICAL at 23:29

## 2024-04-10 RX ADMIN — ACETAMINOPHEN 975 MG: 325 TABLET ORAL at 23:27

## 2024-04-10 NOTE — Clinical Note
Andrea Daniels was seen and treated in our emergency department on 4/10/2024.    ?    ?    ?    Diagnosis: Back pain    Andrea  may return to work on return date.    He may return on this date: 04/13/2024    Please allow for light duty for 1 week after Return to allow for proper healing.     If you have any questions or concerns, please don't hesitate to call.      Hugo Jones MD    ______________________________           _______________          _______________  Hospital Representative                              Date                                Time

## 2024-04-11 NOTE — DISCHARGE INSTRUCTIONS
Continue 975 mg of tylenol every 6 hours and 600 mg of ibuprofen every 6 hours for your pain    Follow up with your primary care provider.

## 2024-04-11 NOTE — ED PROVIDER NOTES
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  Quality:  Aching  Radiates to:  Does not radiate  Pain severity:  Moderate  Pain is:  Same all the time  Onset quality:  Gradual  Duration:  1 day  Timing:  Constant  Progression:  Unchanged  Chronicity:  New  Context: lifting heavy objects and occupational injury    Context: not falling, not jumping from heights, not MVA, not pedestrian accident, not recent illness, not recent injury and not twisting    Relieved by:  None tried  Worsened by:  Ambulation and bending  Ineffective treatments:  None tried  Associated symptoms: no abdominal pain, no chest pain, no fever, no leg pain, no numbness, no paresthesias, no perianal numbness, no tingling and no weakness    Risk factors: no hx of cancer, no hx of osteoporosis, no lack of exercise, not obese, no recent surgery and no steroid use    - Patient has no TUNA FISH red flags: Trauma, unexplained weight loss, neurologic symptoms / retention / overflow incontinence, Age > 50, Fever/night sweats, IVDU, chronic steroids, nor hx of cancer (prostate, renal, breast, lung)      Prior to Admission Medications   Prescriptions Last Dose Informant Patient Reported? Taking?   acetaminophen (TYLENOL) 500 mg tablet   No No   Sig: Take 2 tablets (1,000 mg total) by mouth every 8 (eight) hours as needed for moderate pain or mild pain   baclofen 10 mg tablet   No No   Sig: Take 1 tablet (10 mg total) by mouth 3 (three) times a day   Patient not taking: Reported on 7/14/2023   chlorhexidine (PERIDEX) 0.12 % solution   No No   Sig: Apply 15 mL to the mouth or throat 2 (two) times a day   dicyclomine (BENTYL) 20 mg tablet   No No   Sig: Take 1 tablet (20 mg total) by mouth 2 (two) times a day   famotidine (PEPCID) 20 mg tablet   No No   Sig: Take 1  tablet (20 mg total) by mouth 2 (two) times a day   fluticasone (FLONASE) 50 mcg/act nasal spray   No No   Si spray into each nostril daily   Patient not taking: Reported on 2023   ketorolac (ACULAR) 0.5 % ophthalmic solution   No No   Sig: Administer 1 drop into the left eye every 6 (six) hours   Patient not taking: Reported on 2023   lidocaine (Lidoderm) 5 %   No No   Sig: Apply 1 patch topically daily Remove & Discard patch within 12 hours or as directed by MD   Patient not taking: Reported on 2023   methocarbamol (ROBAXIN) 500 mg tablet   No No   Sig: Take 1 tablet (500 mg total) by mouth 2 (two) times a day   naproxen (NAPROSYN) 500 mg tablet   No No   Sig: Take 1 tablet (500 mg total) by mouth 2 (two) times a day with meals   Patient not taking: Reported on 2023   naproxen (Naprosyn) 500 mg tablet   No No   Sig: Take 1 tablet (500 mg total) by mouth 2 (two) times a day with meals   Patient not taking: Reported on 2023   naproxen (Naprosyn) 500 mg tablet   No No   Sig: Take 1 tablet (500 mg total) by mouth 2 (two) times a day with meals   ondansetron (ZOFRAN-ODT) 4 mg disintegrating tablet   No No   Sig: Take 1 tablet (4 mg total) by mouth every 6 (six) hours as needed for nausea or vomiting      Facility-Administered Medications: None       Past Medical History:   Diagnosis Date    Eye abnormality        Past Surgical History:   Procedure Laterality Date    EYE SURGERY Right        History reviewed. No pertinent family history.  I have reviewed and agree with the history as documented.    E-Cigarette/Vaping    E-Cigarette Use Never User      E-Cigarette/Vaping Substances    Nicotine No     THC No     CBD No     Flavoring No     Other No     Unknown No      Social History     Tobacco Use    Smoking status: Former     Types: Cigars    Smokeless tobacco: Never   Vaping Use    Vaping status: Never Used   Substance Use Topics    Alcohol use: Yes     Comment: rare    Drug use: Yes      Frequency: 7.0 times per week     Types: Marijuana       Review of Systems   Constitutional:  Negative for chills and fever.   HENT:  Negative for hearing loss.    Eyes:  Negative for visual disturbance.   Respiratory:  Negative for shortness of breath.    Cardiovascular:  Negative for chest pain.   Gastrointestinal:  Negative for abdominal pain, constipation, diarrhea, nausea and vomiting.   Genitourinary:  Negative for difficulty urinating.   Musculoskeletal:  Positive for back pain. Negative for myalgias.   Skin:  Negative for rash.   Neurological:  Negative for dizziness, tingling, weakness, numbness and paresthesias.   Psychiatric/Behavioral:  Negative for agitation.    All other systems reviewed and are negative.      Physical Exam  Physical Exam  Vitals and nursing note reviewed.   Constitutional:       General: He is not in acute distress.     Appearance: Normal appearance. He is not ill-appearing.   HENT:      Head: Normocephalic and atraumatic.      Right Ear: External ear normal.      Left Ear: External ear normal.      Nose: Nose normal. No congestion.      Mouth/Throat:      Mouth: Mucous membranes are moist.      Pharynx: Oropharynx is clear. No oropharyngeal exudate.   Eyes:      General:         Right eye: No discharge.         Left eye: No discharge.      Extraocular Movements: Extraocular movements intact.      Conjunctiva/sclera: Conjunctivae normal.      Pupils: Pupils are equal, round, and reactive to light.   Cardiovascular:      Rate and Rhythm: Normal rate and regular rhythm.      Pulses: Normal pulses.      Heart sounds: Normal heart sounds.   Pulmonary:      Effort: Pulmonary effort is normal. No respiratory distress.      Breath sounds: Normal breath sounds. No wheezing.   Abdominal:      General: Abdomen is flat. Bowel sounds are normal. There is no distension.      Palpations: Abdomen is soft.      Tenderness: There is no abdominal tenderness.   Musculoskeletal:         General: No  swelling or deformity. Normal range of motion.      Cervical back: Normal range of motion. No rigidity.      Comments: No C, T, L-spine tenderness.  Tenderness palpation bilateral lower lumbar regions.   Skin:     General: Skin is warm and dry.      Capillary Refill: Capillary refill takes less than 2 seconds.   Neurological:      General: No focal deficit present.      Mental Status: He is alert and oriented to person, place, and time. Mental status is at baseline.      Cranial Nerves: No cranial nerve deficit.      Motor: No weakness.      Gait: Gait normal.   Psychiatric:         Mood and Affect: Mood normal.         Behavior: Behavior normal.         Vital Signs  ED Triage Vitals [04/10/24 2305]   Temperature Pulse Respirations Blood Pressure SpO2   97.5 °F (36.4 °C) 60 20 112/70 100 %      Temp Source Heart Rate Source Patient Position - Orthostatic VS BP Location FiO2 (%)   Tympanic Monitor Sitting Right arm --      Pain Score       5           Vitals:    04/10/24 2305 04/10/24 2335   BP: 112/70    Pulse: 60    Patient Position - Orthostatic VS: Sitting Sitting         Visual Acuity  Visual Acuity      Flowsheet Row Most Recent Value   L Pupil Size (mm) 3   R Pupil Size (mm) 3            ED Medications  Medications   lidocaine (LIDODERM) 5 % patch 1 patch (1 patch Topical Medication Applied 4/10/24 2329)   acetaminophen (TYLENOL) tablet 975 mg (975 mg Oral Given 4/10/24 2327)   ketorolac (TORADOL) injection 15 mg (15 mg Intramuscular Given 4/10/24 2331)       Diagnostic Studies  Results Reviewed       None                   No orders to display              Procedures  Procedures         ED Course                                             Medical Decision Making  29-year-old male presenting to ED today with acute lower back pain.  No red flag symptoms at this time.  Likely musculoskeletal strain of the bilateral lower lumbar regions.  He has not tried anything for pain.  I discussed with him that he should  try Tylenol or Profen prior to coming to the ED.  Will treat him now with Tylenol, lidocaine patch, Toradol.  Encouraged outpatient follow-up with comprehensive spine.  Encouraged continuing Tylenol and ibuprofen as an outpatient.  Work note provided.  Patient discharged home, return precautions discussed.    Risk  OTC drugs.  Prescription drug management.             Disposition  Final diagnoses:   Acute bilateral low back pain without sciatica     Time reflects when diagnosis was documented in both MDM as applicable and the Disposition within this note       Time User Action Codes Description Comment    4/10/2024 11:32 PM Hugo Jones Add [M54.50] Acute bilateral low back pain without sciatica           ED Disposition       ED Disposition   Discharge    Condition   Stable    Date/Time   Wed Apr 10, 2024 11:48 PM    Comment   Andrea Daniels discharge to home/self care.                   Follow-up Information       Follow up With Specialties Details Why Contact Info Additional Information    [facility] Emergency Department Emergency Medicine Go to  If symptoms worsen, As needed [address] [facility] [address]  [phone] [facility], [address] [place], [place], [address]    [facility] Physical Therapy Call  to schedule an appointment for managment of your back pain. [phone]            Discharge Medication List as of 4/10/2024 11:32 PM        CONTINUE these medications which have NOT CHANGED    Details   acetaminophen (TYLENOL) 500 mg tablet Take 2 tablets (1,000 mg total) by mouth every 8 (eight) hours as needed for moderate pain or mild pain, Starting Wed 9/20/2023, Normal      baclofen 10 mg tablet Take 1 tablet (10 mg total) by mouth 3 (three) times a day, Starting Fri 10/14/2022, Normal      chlorhexidine (PERIDEX) 0.12 % solution Apply 15 mL to the mouth or throat 2 (two)  times a day, Starting Fri 7/14/2023, Normal      dicyclomine (BENTYL) 20 mg tablet Take 1 tablet (20 mg total) by mouth 2 (two) times a day, Starting Wed 9/20/2023, Normal      famotidine (PEPCID) 20 mg tablet Take 1 tablet (20 mg total) by mouth 2 (two) times a day, Starting Wed 10/18/2023, Normal      fluticasone (FLONASE) 50 mcg/act nasal spray 1 spray into each nostril daily, Starting Fri 10/21/2022, Normal      ketorolac (ACULAR) 0.5 % ophthalmic solution Administer 1 drop into the left eye every 6 (six) hours, Starting Fri 7/15/2022, Normal      lidocaine (Lidoderm) 5 % Apply 1 patch topically daily Remove & Discard patch within 12 hours or as directed by MD, Starting Fri 10/14/2022, Normal      methocarbamol (ROBAXIN) 500 mg tablet Take 1 tablet (500 mg total) by mouth 2 (two) times a day, Starting Wed 1/17/2024, Normal      !! naproxen (NAPROSYN) 500 mg tablet Take 1 tablet (500 mg total) by mouth 2 (two) times a day with meals, Starting Fri 10/14/2022, Normal      !! naproxen (Naprosyn) 500 mg tablet Take 1 tablet (500 mg total) by mouth 2 (two) times a day with meals, Starting Fri 10/21/2022, Normal      !! naproxen (Naprosyn) 500 mg tablet Take 1 tablet (500 mg total) by mouth 2 (two) times a day with meals, Starting Fri 7/14/2023, Normal      ondansetron (ZOFRAN-ODT) 4 mg disintegrating tablet Take 1 tablet (4 mg total) by mouth every 6 (six) hours as needed for nausea or vomiting, Starting Wed 9/20/2023, Normal       !! - Potential duplicate medications found. Please discuss with provider.          No discharge procedures on file.    PDMP Review       None            ED Provider  Electronically Signed by             Hugo Jones MD  04/10/24 2789

## 2024-08-08 ENCOUNTER — HOSPITAL ENCOUNTER (EMERGENCY)
Facility: HOSPITAL | Age: 29
Discharge: HOME | End: 2024-08-08
Attending: STUDENT IN AN ORGANIZED HEALTH CARE EDUCATION/TRAINING PROGRAM
Payer: COMMERCIAL

## 2024-08-08 VITALS
SYSTOLIC BLOOD PRESSURE: 112 MMHG | DIASTOLIC BLOOD PRESSURE: 70 MMHG | TEMPERATURE: 97.8 F | WEIGHT: 123.8 LBS | HEIGHT: 68 IN | RESPIRATION RATE: 16 BRPM | BODY MASS INDEX: 18.76 KG/M2 | HEART RATE: 65 BPM | OXYGEN SATURATION: 99 %

## 2024-08-08 DIAGNOSIS — H60.91 RIGHT OTITIS EXTERNA: Primary | ICD-10-CM

## 2024-08-08 DIAGNOSIS — H65.192 ACUTE EFFUSION OF LEFT EAR: ICD-10-CM

## 2024-08-08 PROCEDURE — 99282 EMERGENCY DEPT VISIT SF MDM: CPT

## 2024-08-08 PROCEDURE — 99284 EMERGENCY DEPT VISIT MOD MDM: CPT | Performed by: PHYSICIAN ASSISTANT

## 2024-08-08 RX ORDER — OFLOXACIN 3 MG/ML
10 SOLUTION AURICULAR (OTIC) DAILY
Qty: 5 ML | Refills: 0 | Status: SHIPPED | OUTPATIENT
Start: 2024-08-08 | End: 2024-08-15

## 2024-08-08 RX ORDER — FLUTICASONE PROPIONATE 50 MCG
1 SPRAY, SUSPENSION (ML) NASAL DAILY
Qty: 16 G | Refills: 0 | Status: SHIPPED | OUTPATIENT
Start: 2024-08-08 | End: 2024-08-11

## 2024-08-08 NOTE — ED PROVIDER NOTES
"History  Chief Complaint   Patient presents with    Earache     Pt c/o right inner ear pain since approx 2am with pain radiating to right jaw. Denies any drainage, no known injuries, does use q tips and noted \"a lot of wax on  when I cleaned my ears\" denies fever/chills. Ibuprofen and OTC ear drops without relief.      29-year-old male presenting today with right sided ear pain that woke him up from sleep.  States he is not having any dental pain however it does radiate to the TMJ.  Relays that he uses Q-tips and is unsure if this is related, has not had any drainage or recent swimming.  Denies fevers, nausea, vomiting, cough, congestion, sore throat etc.        Prior to Admission Medications   Prescriptions Last Dose Informant Patient Reported? Taking?   acetaminophen (TYLENOL) 500 mg tablet Not Taking  No No   Sig: Take 2 tablets (1,000 mg total) by mouth every 8 (eight) hours as needed for moderate pain or mild pain   Patient not taking: Reported on 2024   baclofen 10 mg tablet Not Taking  No No   Sig: Take 1 tablet (10 mg total) by mouth 3 (three) times a day   Patient not taking: Reported on 2023   chlorhexidine (PERIDEX) 0.12 % solution Not Taking  No No   Sig: Apply 15 mL to the mouth or throat 2 (two) times a day   Patient not taking: Reported on 2024   dicyclomine (BENTYL) 20 mg tablet Not Taking  No No   Sig: Take 1 tablet (20 mg total) by mouth 2 (two) times a day   Patient not taking: Reported on 2024   famotidine (PEPCID) 20 mg tablet Not Taking  No No   Sig: Take 1 tablet (20 mg total) by mouth 2 (two) times a day   Patient not taking: Reported on 2024   fluticasone (FLONASE) 50 mcg/act nasal spray Not Taking  No No   Si spray into each nostril daily   Patient not taking: Reported on 2023   ketorolac (ACULAR) 0.5 % ophthalmic solution Not Taking  No No   Sig: Administer 1 drop into the left eye every 6 (six) hours   Patient not taking: Reported on 2023 "   lidocaine (Lidoderm) 5 % Not Taking  No No   Sig: Apply 1 patch topically daily Remove & Discard patch within 12 hours or as directed by MD   Patient not taking: Reported on 7/14/2023   methocarbamol (ROBAXIN) 500 mg tablet Not Taking  No No   Sig: Take 1 tablet (500 mg total) by mouth 2 (two) times a day   Patient not taking: Reported on 8/8/2024   naproxen (NAPROSYN) 500 mg tablet Not Taking  No No   Sig: Take 1 tablet (500 mg total) by mouth 2 (two) times a day with meals   Patient not taking: Reported on 7/14/2023   naproxen (Naprosyn) 500 mg tablet Not Taking  No No   Sig: Take 1 tablet (500 mg total) by mouth 2 (two) times a day with meals   Patient not taking: Reported on 7/14/2023   naproxen (Naprosyn) 500 mg tablet Not Taking  No No   Sig: Take 1 tablet (500 mg total) by mouth 2 (two) times a day with meals   Patient not taking: Reported on 8/8/2024   ondansetron (ZOFRAN-ODT) 4 mg disintegrating tablet Not Taking  No No   Sig: Take 1 tablet (4 mg total) by mouth every 6 (six) hours as needed for nausea or vomiting   Patient not taking: Reported on 8/8/2024      Facility-Administered Medications: None       Past Medical History:   Diagnosis Date    Eye abnormality        Past Surgical History:   Procedure Laterality Date    EYE SURGERY Right        History reviewed. No pertinent family history.  I have reviewed and agree with the history as documented.    E-Cigarette/Vaping    E-Cigarette Use Never User      E-Cigarette/Vaping Substances    Nicotine No     THC No     CBD No     Flavoring No     Other No     Unknown No      Social History     Tobacco Use    Smoking status: Former     Types: Cigars    Smokeless tobacco: Never   Vaping Use    Vaping status: Never Used   Substance Use Topics    Alcohol use: Yes     Comment: rare    Drug use: Yes     Frequency: 7.0 times per week     Types: Marijuana     Comment: denies presently 08/2024       Review of Systems   Constitutional: Negative.  Negative for chills,  fatigue and fever.   HENT:  Positive for ear pain. Negative for congestion, postnasal drip, rhinorrhea and sore throat.    Eyes: Negative.    Respiratory: Negative.  Negative for cough, shortness of breath and wheezing.    Cardiovascular: Negative.    Gastrointestinal: Negative.  Negative for abdominal pain, diarrhea, nausea and vomiting.   Endocrine: Negative.    Genitourinary: Negative.    Musculoskeletal: Negative.    Skin: Negative.    Neurological: Negative.    Hematological: Negative.    Psychiatric/Behavioral: Negative.     All other systems reviewed and are negative.      Physical Exam  Physical Exam  Vitals and nursing note reviewed.   Constitutional:       Appearance: Normal appearance.   HENT:      Head: Normocephalic and atraumatic.        Right Ear: External ear normal.      Left Ear: Tympanic membrane, ear canal and external ear normal.      Nose: Nose normal.   Eyes:      Conjunctiva/sclera: Conjunctivae normal.   Neck:      Vascular: No carotid bruit.   Cardiovascular:      Rate and Rhythm: Normal rate.      Pulses: Normal pulses.   Pulmonary:      Effort: Pulmonary effort is normal.   Abdominal:      General: There is no distension.   Musculoskeletal:         General: No deformity. Normal range of motion.      Cervical back: Normal range of motion and neck supple. No rigidity or tenderness.   Lymphadenopathy:      Cervical: No cervical adenopathy.   Skin:     General: Skin is warm and dry.      Capillary Refill: Capillary refill takes less than 2 seconds.      Findings: No rash.   Neurological:      General: No focal deficit present.      Mental Status: He is alert and oriented to person, place, and time. Mental status is at baseline.   Psychiatric:         Mood and Affect: Mood normal.         Behavior: Behavior normal.         Thought Content: Thought content normal.         Judgment: Judgment normal.         Vital Signs  ED Triage Vitals [08/08/24 1249]   Temperature Pulse Respirations Blood  Pressure SpO2   97.8 °F (36.6 °C) 65 16 112/70 99 %      Temp Source Heart Rate Source Patient Position - Orthostatic VS BP Location FiO2 (%)   Temporal Monitor Sitting Right arm --      Pain Score       8           Vitals:    08/08/24 1249   BP: 112/70   Pulse: 65   Patient Position - Orthostatic VS: Sitting         Visual Acuity      ED Medications  Medications - No data to display    Diagnostic Studies  Results Reviewed       None                   No orders to display              Procedures  Procedures         ED Course                                 SBIRT 22yo+      Flowsheet Row Most Recent Value   Initial Alcohol Screen: US AUDIT-C     1. How often do you have a drink containing alcohol? 1 Filed at: 08/08/2024 1251   2. How many drinks containing alcohol do you have on a typical day you are drinking?  0 Filed at: 08/08/2024 1251   3a. Male UNDER 65: How often do you have five or more drinks on one occasion? 0 Filed at: 08/08/2024 1251   3b. FEMALE Any Age, or MALE 65+: How often do you have 4 or more drinks on one occassion? 0 Filed at: 08/08/2024 1251   Audit-C Score 1 Filed at: 08/08/2024 1251   JO: How many times in the past year have you...    Used an illegal drug or used a prescription medication for non-medical reasons? Never Filed at: 08/08/2024 1251                      Medical Decision Making  Will treat for potentially early otitis externa as well as serous effusion to left TM.     Patient is informed to return to the emergency department for worsening of symptoms and was given proper education regarding their diagnosis and symptoms. Otherwise the patient is informed to follow up with their primary care doctor for re-evaluation. The patient verbalizes understanding and agrees with above assessment and plan. All questions were answered.                     Disposition  Final diagnoses:   Right otitis externa   Acute effusion of left ear     Time reflects when diagnosis was documented in both MDM  as applicable and the Disposition within this note       Time User Action Codes Description Comment    8/8/2024  2:08 PM Hazel Schwartz Add [H60.91] Right otitis externa     8/8/2024  2:08 PM Hazel Schwartz [H65.192] Acute effusion of left ear           ED Disposition       ED Disposition   Discharge    Condition   Stable    Date/Time   Thu Aug 8, 2024 1408    Comment   Andrea Carlos Alberto Givensle discharge to home/self care.                   Follow-up Information       Follow up With Specialties Details Why Contact Info Additional Information    [facility] Emergency Department Emergency Medicine Go to  If symptoms worsen, otherwise please follow up with your family doctor [address] [place] [id number] [phone] [facility] Emergency Department, [address] [place] [address], [phone]            Patient's Medications   Discharge Prescriptions    FLUTICASONE (FLONASE) 50 MCG/ACT NASAL SPRAY    1 spray into each nostril daily for 3 days       Start Date: 8/8/2024  End Date: 8/11/2024       Order Dose: 1 spray       Quantity: 16 g    Refills: 0    OFLOXACIN (FLOXIN) 0.3 % OTIC SOLUTION    Administer 10 drops to the right ear daily for 7 days       Start Date: 8/8/2024  End Date: 8/15/2024       Order Dose: 10 drops       Quantity: 5 mL    Refills: 0       No discharge procedures on file.    PDMP Review       None            ED Provider  Electronically Signed by             Hazel Schwartz PA-C  08/08/24 7066

## 2024-11-19 ENCOUNTER — HOSPITAL ENCOUNTER (EMERGENCY)
Facility: HOSPITAL | Age: 29
Discharge: HOME | End: 2024-11-19
Attending: EMERGENCY MEDICINE | Admitting: EMERGENCY MEDICINE
Payer: COMMERCIAL

## 2024-11-19 VITALS
RESPIRATION RATE: 18 BRPM | SYSTOLIC BLOOD PRESSURE: 109 MMHG | HEART RATE: 64 BPM | TEMPERATURE: 97.7 F | OXYGEN SATURATION: 99 % | DIASTOLIC BLOOD PRESSURE: 54 MMHG

## 2024-11-19 DIAGNOSIS — H10.9 CONJUNCTIVITIS: Primary | ICD-10-CM

## 2024-11-19 PROCEDURE — 99284 EMERGENCY DEPT VISIT MOD MDM: CPT | Performed by: EMERGENCY MEDICINE

## 2024-11-19 PROCEDURE — 99282 EMERGENCY DEPT VISIT SF MDM: CPT

## 2024-11-19 RX ORDER — OFLOXACIN 3 MG/ML
1 SOLUTION/ DROPS OPHTHALMIC ONCE
Status: COMPLETED | OUTPATIENT
Start: 2024-11-19 | End: 2024-11-19

## 2024-11-19 RX ORDER — OXYCODONE AND ACETAMINOPHEN 5; 325 MG/1; MG/1
1 TABLET ORAL ONCE
Refills: 0 | Status: COMPLETED | OUTPATIENT
Start: 2024-11-19 | End: 2024-11-19

## 2024-11-19 RX ORDER — TETRACAINE HYDROCHLORIDE 5 MG/ML
2 SOLUTION OPHTHALMIC ONCE
Status: COMPLETED | OUTPATIENT
Start: 2024-11-19 | End: 2024-11-19

## 2024-11-19 RX ORDER — NAPROXEN 500 MG/1
500 TABLET ORAL ONCE
Status: COMPLETED | OUTPATIENT
Start: 2024-11-19 | End: 2024-11-19

## 2024-11-19 RX ADMIN — FLUORESCEIN SODIUM 1 STRIP: 1 STRIP OPHTHALMIC at 01:50

## 2024-11-19 RX ADMIN — NAPROXEN 500 MG: 500 TABLET ORAL at 01:51

## 2024-11-19 RX ADMIN — OFLOXACIN 1 DROP: 3 SOLUTION/ DROPS OPHTHALMIC at 01:50

## 2024-11-19 RX ADMIN — OXYCODONE HYDROCHLORIDE AND ACETAMINOPHEN 1 TABLET: 5; 325 TABLET ORAL at 01:51

## 2024-11-19 RX ADMIN — TETRACAINE HYDROCHLORIDE 2 DROP: 5 SOLUTION OPHTHALMIC at 01:50

## 2024-11-19 RX ADMIN — FLUORESCEIN SODIUM 1 STRIP: 1 STRIP OPHTHALMIC at 03:33

## 2024-11-19 NOTE — ED PROVIDER NOTES
Final Diagnosis:  1. Conjunctivitis        Chief Complaint   Patient presents with    Eye Problem     Patient states that he wears contacts and today the L one became blurry so he cleaned it but it did not resolve. Pt c/o burning in both eyes now, w/ redness noted to both       HPI  Pt has complicated ocular hx. He cannot fully relay it and his care has hitherto been in [place].   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 He is dependent on them for vision. Patient notes burning in both eyes. When we remove contacts they are red exclusively under the contacts forming an angry red ring around his iriss. He notes he's been using his prescribed contacts  and changes them regularly.     They burn.     No other ocular trauma. No chemical exposure. Vision intact but plus minus given his tearing. No purulent d/c. No systemic symptoms. Complete resolution w/ tetracaine, so seems superficial.     Lights bother his eyes.     EMS additionally reports:     - Previous charting underwent limited review with attention to last ED visits, labs, ekgs, and prior imaging.  Chart review reveals :     Admission on 10/17/2023, Discharged on 10/18/2023   Component Date Value Ref Range Status    WBC 10/17/2023 6.84  4.31 - 10.16 Thousand/uL Final    RBC 10/17/2023 4.37  3.88 - 5.62 Million/uL Final    Hemoglobin 10/17/2023 14.6  12.0 - 17.0 g/dL Final    Hematocrit 10/17/2023 42.1  36.5 - 49.3 % Final    MCV 10/17/2023 96  82 - 98 fL Final    MCH 10/17/2023 33.4  26.8 - 34.3 pg Final    MCHC 10/17/2023 34.7  31.4 - 37.4 g/dL Final    RDW 10/17/2023 12.1  11.6 - 15.1 % Final    MPV 10/17/2023 9.5  8.9 - 12.7 fL Final    Platelets 10/17/2023 192  149 - 390 Thousands/uL Final    nRBC 10/17/2023 0  /100 WBCs Final    Segmented % 10/17/2023 35 (L)  43 - 75 % Final    Immature Grans % 10/17/2023 0   0 - 2 % Final    Lymphocytes % 10/17/2023 51 (H)  14 - 44 % Final    Monocytes % 10/17/2023 10  4 - 12 % Final    Eosinophils Relative 10/17/2023 4  0 - 6 % Final    Basophils Relative 10/17/2023 0  0 - 1 % Final    Absolute Neutrophils 10/17/2023 2.36  1.85 - 7.62 Thousands/µL Final    Absolute Immature Grans 10/17/2023 0.01  0.00 - 0.20 Thousand/uL Final    Absolute Lymphocytes 10/17/2023 3.51  0.60 - 4.47 Thousands/µL Final    Absolute Monocytes 10/17/2023 0.65  0.17 - 1.22 Thousand/µL Final    Eosinophils Absolute 10/17/2023 0.29  0.00 - 0.61 Thousand/µL Final    Basophils Absolute 10/17/2023 0.02  0.00 - 0.10 Thousands/µL Final    Sodium 10/17/2023 138  135 - 147 mmol/L Final    Potassium 10/17/2023 3.7  3.5 - 5.3 mmol/L Final    Chloride 10/17/2023 105  96 - 108 mmol/L Final    CO2 10/17/2023 28  21 - 32 mmol/L Final    ANION GAP 10/17/2023 5  mmol/L Final    BUN 10/17/2023 13  5 - 25 mg/dL Final    Creatinine 10/17/2023 1.01  0.60 - 1.30 mg/dL Final    Standardized to IDMS reference method    Glucose 10/17/2023 94  65 - 140 mg/dL Final    If the patient is fasting, the ADA then defines impaired fasting glucose as > 100 mg/dL and diabetes as > or equal to 123 mg/dL.    Calcium 10/17/2023 8.9  8.4 - 10.2 mg/dL Final    eGFR 10/17/2023 101  ml/min/1.73sq m Final    Total Bilirubin 10/17/2023 0.60  0.20 - 1.00 mg/dL Final    Use of this assay is not recommended for patients undergoing treatment with eltrombopag due to the potential for falsely elevated results.  N-acetyl-p-benzoquinone imine (metabolite of Acetaminophen) will generate erroneously low results in samples for patients that have taken an overdose of Acetaminophen.    Bilirubin, Direct 10/17/2023 0.09  0.00 - 0.20 mg/dL Final    Alkaline Phosphatase 10/17/2023 66  34 - 104 U/L Final    AST 10/17/2023 14  13 - 39 U/L Final    ALT 10/17/2023 8  7 - 52 U/L Final    Specimen collection should occur prior to Sulfasalazine administration due to the  potential for falsely depressed results.     Total Protein 10/17/2023 6.8  6.4 - 8.4 g/dL Final    Albumin 10/17/2023 4.2  3.5 - 5.0 g/dL Final    Lipase 10/17/2023 49  11 - 82 u/L Final       - No language barrier.   - History obtained from patient    - Discuss patient's care, with patient permission or by chart review, with      PMH:   has a past medical history of Eye abnormality.    PSH:   has a past surgical history that includes EYE SURGERY (Right).     Social History:  Tobacco Use: Medium Risk (11/19/2024)    Patient History     Smoking Tobacco Use: Former     Smokeless Tobacco Use: Never     Passive Exposure: Not on file     Alcohol Use: Not on file     No illicit use       ROS:  Pertinent positives/negatives: .     Some ROS may be present in the HPI and would take precedent over these standard questions asked below.   Review of Systems   Constitutional:  Negative for chills and fever.   Eyes:  Positive for photophobia, pain, redness and visual disturbance. Negative for discharge.        CONSTITUTIONAL:  No lethargy. No unexpected weight loss. No change in behavior.  EYES:  No pain, redness, or discharge. No loss of vision. No orbital trauma or pain.   ENT:  No tinnitus or decreased hearing. No epistaxis/purulent rhinorrhea. No voice change, airway closing, trismus.   CARDIOVASCULAR:  No chest pain. No skin mottling or pallor. No change in exertional capacity  RESPIRATORY:  No hemoptysis. No paroxysmal nocturnal dyspnea. No stridor. No apnea or bluing.   GASTROINTESTINAL:  No vomiting, diarrhea. No distension. No melena. No hematochezia.   GENITOURINARY:  No nocturia. No hematuria or foul smelling or cloudy urine. No discharge. No sores/adenopathy.   MUSCULOSKELETAL:  No contracture.  No new deformity.   INTEGUMENTARY:  No swelling. No unexpected contusions. No abrasions. No lymphangitis.  NEUROLOGIC:  No meningismus. No new numbness of the extremities. No new focal weakness. No postural  instability  PSYCHIATRIC:  No SI HI AVH  HEMATOLOGICAL:  No bleeding. No petechiae. No bruising.  ALLERGIES:  No urticaria. No sudden abd cramping. No stridor.    PE:     Physical exam highlights:   Physical Exam       Vitals:    11/19/24 0020   BP: 109/54   Pulse: 64   Resp: 18   Temp: 97.7 °F (36.5 °C)   TempSrc: Oral   SpO2: 99%     Vitals reviewed by me.   Nursing note reviewed  Chaperone present for all sensitive exam.  Const: No acute distress. Alert. Nontoxic. Not diaphoretic.    HEENT: External ears normal. No protrusion drainage swelling. Nose normal. No drainage/traumatic deformity. MM. Mouth with baseline/symmetric movement. No trismus.   Eyes: bilateral red ring around iris.photophobia w/ squinting. No ulcer on fluoresecin stain. Bilateral. No bluish visible sclera.   Neck: ROM normal. No rigidity. No meningismus.  Cards: Rate as per vitals Compared to monitor sinus unless documented. Regular Well perfused.  Pulm: Effort and excursion normal. No distress. No audible wheezing/no stridor. Normal resp rate without retraction or change in work of breathing.  Abd: No distension beyond baseline. No fluctuant wave. Patient without peritoneal pain with shifting/bumping the bed.   MSK: ROM normal baseline. No deformity. No contractures from baseline.   Skin: No new rashes visible. Well perfused. No wounds visualized on exposed skin  Neuro: Nonfocal. Baseline. CN grossly intact. Moving all four with coordination.   Psych: Normal behavior and affect.        A:  - Nursing note reviewed.    Ddx and MDM  Considered diagnoses    Given photophobia, iritis/uveitis on ddx. Treat infection, d/c contacts. . No trauma. No flare. Resolution w/ tetracaine argues against, usually needs dilation.     No signs of glaucoma right now, pressures 9 and 10 in right and left respectively w/ icare.     Scleritis  - does not exam this severely.     Conjuntivitis? w/ continued contact use.   I encourage to find some old glasses and avoid  contacts  Pseudomonal coverage used w/ ofloxacin  High dose antiinflam    Keratitis?  Antiinflam - d/c contacts    Dispo decision Close f/u w/ Dr. Lim or other ophthal      My conversation with consultant reveals:    Will be seen in office w Dr Lim      Decision rules:                           My read of the XR/CT scan reveals:     No orders to display       Orders Placed This Encounter   Procedures    Ambulatory Referral to Ophthalmology     Labs Reviewed - No data to display    *Each of these labs was reviewed. Particular standout labs will be noted in the ED Course above     Final Diagnosis:  1. Conjunctivitis          P:  - hospital tx includes   Medications   tetracaine 0.5 % ophthalmic solution 2 drop (2 drops Both Eyes Given 11/19/24 0150)   ofloxacin (OCUFLOX) 0.3 % ophthalmic solution 1 drop (1 drop Both Eyes Given 11/19/24 0150)   fluorescein sodium sterile ophthalmic strip 1 strip (1 strip Both Eyes Given 11/19/24 0150)   oxyCODONE-acetaminophen (PERCOCET) 5-325 mg per tablet 1 tablet (1 tablet Oral Given 11/19/24 0151)   naproxen (NAPROSYN) tablet 500 mg (500 mg Oral Given 11/19/24 0151)   fluorescein sodium sterile ophthalmic strip 1 strip (1 strip Both Eyes Given 11/19/24 0333)         - disposition  Time reflects when diagnosis was documented in both MDM as applicable and the Disposition within this note       Time User Action Codes Description Comment    11/19/2024  3:07 AM Juarez Marie Add [H10.9] Conjunctivitis           ED Disposition       ED Disposition   Discharge    Condition   Stable    Date/Time   Tue Nov 19, 2024  3:06 AM    Comment   Andrea Daniels discharge to home/self care.                   Follow-up Information       Follow up With Specialties Details Why Contact Info    Juan Manuel Lim MD Ophthalmology   [facility] [address] [address]  [phone]              - patient will call their PCP to let them know they were in the emergency  department. We discuss return precautions and patient is agreeable with plan and aformentioned disposition.       - additional treatment intended, if consistent with primary provider:  - patient to follow with :      Discharge Medication List as of 11/19/2024  3:10 AM        CONTINUE these medications which have NOT CHANGED    Details   acetaminophen (TYLENOL) 500 mg tablet Take 2 tablets (1,000 mg total) by mouth every 8 (eight) hours as needed for moderate pain or mild pain, Starting Wed 9/20/2023, Normal      baclofen 10 mg tablet Take 1 tablet (10 mg total) by mouth 3 (three) times a day, Starting Fri 10/14/2022, Normal      chlorhexidine (PERIDEX) 0.12 % solution Apply 15 mL to the mouth or throat 2 (two) times a day, Starting Fri 7/14/2023, Normal      dicyclomine (BENTYL) 20 mg tablet Take 1 tablet (20 mg total) by mouth 2 (two) times a day, Starting Wed 9/20/2023, Normal      famotidine (PEPCID) 20 mg tablet Take 1 tablet (20 mg total) by mouth 2 (two) times a day, Starting Wed 10/18/2023, Normal      fluticasone (FLONASE) 50 mcg/act nasal spray 1 spray into each nostril daily, Starting Fri 10/21/2022, Normal      ketorolac (ACULAR) 0.5 % ophthalmic solution Administer 1 drop into the left eye every 6 (six) hours, Starting Fri 7/15/2022, Normal      lidocaine (Lidoderm) 5 % Apply 1 patch topically daily Remove & Discard patch within 12 hours or as directed by MD, Starting Fri 10/14/2022, Normal      methocarbamol (ROBAXIN) 500 mg tablet Take 1 tablet (500 mg total) by mouth 2 (two) times a day, Starting Wed 1/17/2024, Normal      !! naproxen (NAPROSYN) 500 mg tablet Take 1 tablet (500 mg total) by mouth 2 (two) times a day with meals, Starting Fri 10/14/2022, Normal      !! naproxen (Naprosyn) 500 mg tablet Take 1 tablet (500 mg total) by mouth 2 (two) times a day with meals, Starting Fri 10/21/2022, Normal      !! naproxen (Naprosyn) 500 mg tablet Take 1 tablet (500 mg total) by mouth 2 (two) times a day  with meals, Starting Fri 2023, Normal      ondansetron (ZOFRAN-ODT) 4 mg disintegrating tablet Take 1 tablet (4 mg total) by mouth every 6 (six) hours as needed for nausea or vomiting, Starting Wed 2023, Normal       !! - Potential duplicate medications found. Please discuss with provider.          Prior to Admission Medications   Prescriptions Last Dose Informant Patient Reported? Taking?   acetaminophen (TYLENOL) 500 mg tablet   No No   Sig: Take 2 tablets (1,000 mg total) by mouth every 8 (eight) hours as needed for moderate pain or mild pain   Patient not taking: Reported on 2024   baclofen 10 mg tablet   No No   Sig: Take 1 tablet (10 mg total) by mouth 3 (three) times a day   Patient not taking: Reported on 2023   chlorhexidine (PERIDEX) 0.12 % solution   No No   Sig: Apply 15 mL to the mouth or throat 2 (two) times a day   Patient not taking: Reported on 2024   dicyclomine (BENTYL) 20 mg tablet   No No   Sig: Take 1 tablet (20 mg total) by mouth 2 (two) times a day   Patient not taking: Reported on 2024   famotidine (PEPCID) 20 mg tablet   No No   Sig: Take 1 tablet (20 mg total) by mouth 2 (two) times a day   Patient not taking: Reported on 2024   fluticasone (FLONASE) 50 mcg/act nasal spray   No No   Si spray into each nostril daily   Patient not taking: Reported on 2023   fluticasone (FLONASE) 50 mcg/act nasal spray   No No   Si spray into each nostril daily for 3 days   ketorolac (ACULAR) 0.5 % ophthalmic solution   No No   Sig: Administer 1 drop into the left eye every 6 (six) hours   Patient not taking: Reported on 2023   lidocaine (Lidoderm) 5 %   No No   Sig: Apply 1 patch topically daily Remove & Discard patch within 12 hours or as directed by MD   Patient not taking: Reported on 2023   methocarbamol (ROBAXIN) 500 mg tablet   No No   Sig: Take 1 tablet (500 mg total) by mouth 2 (two) times a day   Patient not taking: Reported on 2024  "  naproxen (NAPROSYN) 500 mg tablet   No No   Sig: Take 1 tablet (500 mg total) by mouth 2 (two) times a day with meals   Patient not taking: Reported on 7/14/2023   naproxen (Naprosyn) 500 mg tablet   No No   Sig: Take 1 tablet (500 mg total) by mouth 2 (two) times a day with meals   Patient not taking: Reported on 7/14/2023   naproxen (Naprosyn) 500 mg tablet   No No   Sig: Take 1 tablet (500 mg total) by mouth 2 (two) times a day with meals   Patient not taking: Reported on 8/8/2024   ondansetron (ZOFRAN-ODT) 4 mg disintegrating tablet   No No   Sig: Take 1 tablet (4 mg total) by mouth every 6 (six) hours as needed for nausea or vomiting   Patient not taking: Reported on 8/8/2024      Facility-Administered Medications: None       Portions of the record may have been created with voice recognition software. Occasional wrong word or \"sound a like\" substitutions may have occurred due to the inherent limitations of voice recognition software. Read the chart carefully and recognize, using context, where substitutions have occurred.    Electronically signed by:  MD Juarez Rich MD  11/20/24 2241       Juarez Marie MD  11/20/24 2242    "

## 2024-11-19 NOTE — Clinical Note
Andrea Daniels was seen and treated in our emergency department on 11/19/2024.                Diagnosis:     Andrea  may return to work on return date.    He may return on this date: 11/21/2024         If you have any questions or concerns, please don't hesitate to call.      Erika Colon RN    ______________________________           _______________          _______________  Hospital Representative                              Date                                Time

## 2025-02-04 ENCOUNTER — HOSPITAL ENCOUNTER (EMERGENCY)
Facility: HOSPITAL | Age: 30
Discharge: HOME | End: 2025-02-05
Attending: EMERGENCY MEDICINE | Admitting: EMERGENCY MEDICINE
Payer: COMMERCIAL

## 2025-02-04 DIAGNOSIS — K52.9 GASTROENTERITIS: Primary | ICD-10-CM

## 2025-02-04 PROCEDURE — 99284 EMERGENCY DEPT VISIT MOD MDM: CPT | Performed by: EMERGENCY MEDICINE

## 2025-02-04 PROCEDURE — 99283 EMERGENCY DEPT VISIT LOW MDM: CPT

## 2025-02-04 NOTE — Clinical Note
Andrea Carlos Alberto Corral Valle was seen and treated in our emergency department on 2/4/2025.                Diagnosis:     Andrea  .    He may return on this date:          If you have any questions or concerns, please don't hesitate to call.      Juarez Marie MD    ______________________________           _______________          _______________  Hospital Representative                              Date                                Time

## 2025-02-05 VITALS
HEART RATE: 57 BPM | TEMPERATURE: 97.8 F | DIASTOLIC BLOOD PRESSURE: 65 MMHG | SYSTOLIC BLOOD PRESSURE: 119 MMHG | OXYGEN SATURATION: 100 % | RESPIRATION RATE: 16 BRPM

## 2025-02-05 LAB — GLUCOSE SERPL-MCNC: 91 MG/DL (ref 65–140)

## 2025-02-05 PROCEDURE — 82948 REAGENT STRIP/BLOOD GLUCOSE: CPT

## 2025-02-05 RX ORDER — FAMOTIDINE 20 MG/1
20 TABLET, FILM COATED ORAL 2 TIMES DAILY
Qty: 10 TABLET | Refills: 0 | Status: SHIPPED | OUTPATIENT
Start: 2025-02-05 | End: 2025-02-10

## 2025-02-05 RX ORDER — NAPROXEN 500 MG/1
500 TABLET ORAL ONCE
Status: COMPLETED | OUTPATIENT
Start: 2025-02-05 | End: 2025-02-05

## 2025-02-05 RX ORDER — NAPROXEN 500 MG/1
500 TABLET ORAL 2 TIMES DAILY WITH MEALS
Qty: 30 TABLET | Refills: 0 | Status: SHIPPED | OUTPATIENT
Start: 2025-02-05

## 2025-02-05 RX ORDER — DICYCLOMINE HCL 20 MG
20 TABLET ORAL 2 TIMES DAILY
Qty: 20 TABLET | Refills: 0 | Status: SHIPPED | OUTPATIENT
Start: 2025-02-05

## 2025-02-05 RX ORDER — LOPERAMIDE HYDROCHLORIDE 2 MG/1
2 CAPSULE ORAL 4 TIMES DAILY PRN
Qty: 30 CAPSULE | Refills: 0 | Status: SHIPPED | OUTPATIENT
Start: 2025-02-05

## 2025-02-05 RX ORDER — ONDANSETRON 4 MG/1
4 TABLET, ORALLY DISINTEGRATING ORAL EVERY 8 HOURS PRN
Qty: 20 TABLET | Refills: 0 | Status: SHIPPED | OUTPATIENT
Start: 2025-02-05

## 2025-02-05 RX ORDER — DICYCLOMINE HYDROCHLORIDE 10 MG/1
20 CAPSULE ORAL ONCE
Status: COMPLETED | OUTPATIENT
Start: 2025-02-05 | End: 2025-02-05

## 2025-02-05 RX ORDER — ONDANSETRON 4 MG/1
4 TABLET, ORALLY DISINTEGRATING ORAL ONCE
Status: COMPLETED | OUTPATIENT
Start: 2025-02-05 | End: 2025-02-05

## 2025-02-05 RX ADMIN — DICYCLOMINE HYDROCHLORIDE 20 MG: 10 CAPSULE ORAL at 00:29

## 2025-02-05 RX ADMIN — ONDANSETRON 4 MG: 4 TABLET, ORALLY DISINTEGRATING ORAL at 00:30

## 2025-02-05 RX ADMIN — NAPROXEN 500 MG: 500 TABLET ORAL at 00:30

## 2025-02-06 NOTE — ED PROVIDER NOTES
Final Diagnosis:  1. Gastroenteritis        Chief Complaint   Patient presents with    Abdominal Pain     Pt states he has a stomach virus and stating as he was vomiting he got lightheaded and sweaty.  Pt states he does not have an appetite currently.  Pt is currently not lightheaded or dizzy took tylenol PTA       HPI  Pt pres w/ n/v  After vomiting ffelt lightheaded    Dec appetite couple days  No abd pain    Feeling better now took some tylenol    Abd cramping like going to have diarrhea    Well appearing  Soft abd  No resp symptoms  No fever/chills.     EMS additionally reports:     - Previous charting underwent limited review with attention to last ED visits, labs, ekgs, and prior imaging.  Chart review reveals :     Admission on 10/17/2023, Discharged on 10/18/2023   Component Date Value Ref Range Status    WBC 10/17/2023 6.84  4.31 - 10.16 Thousand/uL Final    RBC 10/17/2023 4.37  3.88 - 5.62 Million/uL Final    Hemoglobin 10/17/2023 14.6  12.0 - 17.0 g/dL Final    Hematocrit 10/17/2023 42.1  36.5 - 49.3 % Final    MCV 10/17/2023 96  82 - 98 fL Final    MCH 10/17/2023 33.4  26.8 - 34.3 pg Final    MCHC 10/17/2023 34.7  31.4 - 37.4 g/dL Final    RDW 10/17/2023 12.1  11.6 - 15.1 % Final    MPV 10/17/2023 9.5  8.9 - 12.7 fL Final    Platelets 10/17/2023 192  149 - 390 Thousands/uL Final    nRBC 10/17/2023 0  /100 WBCs Final    Segmented % 10/17/2023 35 (L)  43 - 75 % Final    Immature Grans % 10/17/2023 0  0 - 2 % Final    Lymphocytes % 10/17/2023 51 (H)  14 - 44 % Final    Monocytes % 10/17/2023 10  4 - 12 % Final    Eosinophils Relative 10/17/2023 4  0 - 6 % Final    Basophils Relative 10/17/2023 0  0 - 1 % Final    Absolute Neutrophils 10/17/2023 2.36  1.85 - 7.62 Thousands/µL Final    Absolute Immature Grans 10/17/2023 0.01  0.00 - 0.20 Thousand/uL Final    Absolute Lymphocytes 10/17/2023 3.51  0.60 - 4.47 Thousands/µL Final    Absolute Monocytes 10/17/2023 0.65  0.17 - 1.22 Thousand/µL Final    Eosinophils  Absolute 10/17/2023 0.29  0.00 - 0.61 Thousand/µL Final    Basophils Absolute 10/17/2023 0.02  0.00 - 0.10 Thousands/µL Final    Sodium 10/17/2023 138  135 - 147 mmol/L Final    Potassium 10/17/2023 3.7  3.5 - 5.3 mmol/L Final    Chloride 10/17/2023 105  96 - 108 mmol/L Final    CO2 10/17/2023 28  21 - 32 mmol/L Final    ANION GAP 10/17/2023 5  mmol/L Final    BUN 10/17/2023 13  5 - 25 mg/dL Final    Creatinine 10/17/2023 1.01  0.60 - 1.30 mg/dL Final    Standardized to IDMS reference method    Glucose 10/17/2023 94  65 - 140 mg/dL Final    If the patient is fasting, the ADA then defines impaired fasting glucose as > 100 mg/dL and diabetes as > or equal to 123 mg/dL.    Calcium 10/17/2023 8.9  8.4 - 10.2 mg/dL Final    eGFR 10/17/2023 101  ml/min/1.73sq m Final    Total Bilirubin 10/17/2023 0.60  0.20 - 1.00 mg/dL Final    Use of this assay is not recommended for patients undergoing treatment with eltrombopag due to the potential for falsely elevated results.  N-acetyl-p-benzoquinone imine (metabolite of Acetaminophen) will generate erroneously low results in samples for patients that have taken an overdose of Acetaminophen.    Bilirubin, Direct 10/17/2023 0.09  0.00 - 0.20 mg/dL Final    Alkaline Phosphatase 10/17/2023 66  34 - 104 U/L Final    AST 10/17/2023 14  13 - 39 U/L Final    ALT 10/17/2023 8  7 - 52 U/L Final    Specimen collection should occur prior to Sulfasalazine administration due to the potential for falsely depressed results.     Total Protein 10/17/2023 6.8  6.4 - 8.4 g/dL Final    Albumin 10/17/2023 4.2  3.5 - 5.0 g/dL Final    Lipase 10/17/2023 49  11 - 82 u/L Final       - No language barrier.   - History obtained from patient    - Discuss patient's care, with patient permission or by chart review, with      PMH:   has a past medical history of Eye abnormality.    PSH:   has a past surgical history that includes EYE SURGERY (Right).     Social History:  Tobacco Use: Medium Risk (2/5/2025)     Patient History     Smoking Tobacco Use: Former     Smokeless Tobacco Use: Never     Passive Exposure: Not on file     Alcohol Use: Not on file     No illicit use       ROS:  Pertinent positives/negatives: .     Some ROS may be present in the HPI and would take precedent over these standard questions asked below.   Review of Systems   Constitutional:  Positive for appetite change, chills and fatigue. Negative for fever.   Respiratory:  Negative for cough, shortness of breath and wheezing.    Cardiovascular:  Negative for chest pain and palpitations.   Gastrointestinal:  Positive for diarrhea, nausea and vomiting. Negative for abdominal distention and abdominal pain.   Neurological:  Positive for light-headedness.        CONSTITUTIONAL:  No lethargy. No unexpected weight loss. No change in behavior.  EYES:  No pain, redness, or discharge. No loss of vision. No orbital trauma or pain.   ENT:  No tinnitus or decreased hearing. No epistaxis/purulent rhinorrhea. No voice change, airway closing, trismus.   CARDIOVASCULAR:  No chest pain. No skin mottling or pallor. No change in exertional capacity  RESPIRATORY:  No hemoptysis. No paroxysmal nocturnal dyspnea. No stridor. No apnea or bluing.   GASTROINTESTINAL:  No vomiting, diarrhea. No distension. No melena. No hematochezia.   GENITOURINARY:  No nocturia. No hematuria or foul smelling or cloudy urine. No discharge. No sores/adenopathy.   MUSCULOSKELETAL:  No contracture.  No new deformity.   INTEGUMENTARY:  No swelling. No unexpected contusions. No abrasions. No lymphangitis.  NEUROLOGIC:  No meningismus. No new numbness of the extremities. No new focal weakness. No postural instability  PSYCHIATRIC:  No SI HI AVH  HEMATOLOGICAL:  No bleeding. No petechiae. No bruising.  ALLERGIES:  No urticaria. No sudden abd cramping. No stridor.    PE:     Physical exam highlights:   Physical Exam       Vitals:    02/05/25 0001 02/05/25 0003   BP: 119/65    BP Location: Right arm     Pulse: 57    Resp: 16    Temp:  97.8 °F (36.6 °C)   TempSrc:  Oral   SpO2: 100%      Vitals reviewed by me.   Nursing note reviewed  Chaperone present for all sensitive exam.  Const: No acute distress. Alert. Nontoxic. Not diaphoretic.    HEENT: External ears normal. No protrusion drainage swelling. Nose normal. No drainage/traumatic deformity. MM. Mouth with baseline/symmetric movement. No trismus.   Eyes: No squinting. No icterus. No tearing/swelling/drainage. Tracks through the room with normal EOM.   Neck: ROM normal. No rigidity. No meningismus.  Cards: Rate as per vitals Compared to monitor sinus unless documented. Regular Well perfused.  Pulm: Effort and excursion normal. No distress. No audible wheezing/no stridor. Normal resp rate without retraction or change in work of breathing. CTAB  Abd: No distension beyond baseline. No fluctuant wave. Patient without peritoneal pain with shifting/bumping the bed. Soft   MSK: ROM normal baseline. No deformity. No contractures from baseline.   Skin: No new rashes visible. Well perfused. No wounds visualized on exposed skin  Neuro: Nonfocal. Baseline. CN grossly intact. Moving all four with coordination.   Psych: Normal behavior and affect.        A:  - Nursing note reviewed.    Ddx and MDM  Considered diagnoses    Offer blood work but pt opts for symptomatic tx and return prec  Benign exam, sounds viral gastro, ok with plan    R/o DM  Fingerstick    Symptoms tx    Exam unlike appendicitis  Unlike cholecystitis          Dispo decision       My conversation with consultant reveals:        Decision rules:                           My read of the XR/CT scan reveals:  No orders to display       Orders Placed This Encounter   Procedures    Fingerstick Glucose (POCT)     Labs Reviewed   POCT GLUCOSE - Normal       Result Value Ref Range Status    POC Glucose 91  65 - 140 mg/dl Final       *Each of these labs was reviewed. Particular standout labs will be noted in the ED  Course above     Final Diagnosis:  1. Gastroenteritis          P:  - hospital tx includes   Medications   ondansetron (ZOFRAN-ODT) dispersible tablet 4 mg (4 mg Oral Given 2/5/25 0030)   dicyclomine (BENTYL) capsule 20 mg (20 mg Oral Given 2/5/25 0029)   naproxen (NAPROSYN) tablet 500 mg (500 mg Oral Given 2/5/25 0030)         - disposition  Time reflects when diagnosis was documented in both MDM as applicable and the Disposition within this note       Time User Action Codes Description Comment    2/5/2025 12:20 AM Juarez Marie [K52.9] Gastroenteritis           ED Disposition       ED Disposition   Discharge    Condition   Stable    Date/Time   Wed Feb 5, 2025 12:20 AM    Comment   Andrea Daniels discharge to home/self care.                   Follow-up Information    None         - patient will call their PCP to let them know they were in the emergency department. We discuss return precautions and patient is agreeable with plan and aformentioned disposition.       - additional treatment intended, if consistent with primary provider:  - patient to follow with :      Discharge Medication List as of 2/5/2025 12:26 AM        START taking these medications    Details   !! dicyclomine (BENTYL) 20 mg tablet Take 1 tablet (20 mg total) by mouth 2 (two) times a day, Starting Wed 2/5/2025, Normal      !! famotidine (PEPCID) 20 mg tablet Take 1 tablet (20 mg total) by mouth 2 (two) times a day for 5 days, Starting Wed 2/5/2025, Until Mon 2/10/2025, Normal      loperamide (IMODIUM) 2 mg capsule Take 1 capsule (2 mg total) by mouth 4 (four) times a day as needed for diarrhea, Starting Wed 2/5/2025, Normal      !! naproxen (Naprosyn) 500 mg tablet Take 1 tablet (500 mg total) by mouth 2 (two) times a day with meals, Starting Wed 2/5/2025, Normal      !! ondansetron (ZOFRAN-ODT) 4 mg disintegrating tablet Take 1 tablet (4 mg total) by mouth every 8 (eight) hours as needed for nausea or vomiting, Starting Wed  2/5/2025, Normal       !! - Potential duplicate medications found. Please discuss with provider.        CONTINUE these medications which have NOT CHANGED    Details   acetaminophen (TYLENOL) 500 mg tablet Take 2 tablets (1,000 mg total) by mouth every 8 (eight) hours as needed for moderate pain or mild pain, Starting Wed 9/20/2023, Normal      baclofen 10 mg tablet Take 1 tablet (10 mg total) by mouth 3 (three) times a day, Starting Fri 10/14/2022, Normal      chlorhexidine (PERIDEX) 0.12 % solution Apply 15 mL to the mouth or throat 2 (two) times a day, Starting Fri 7/14/2023, Normal      !! dicyclomine (BENTYL) 20 mg tablet Take 1 tablet (20 mg total) by mouth 2 (two) times a day, Starting Wed 9/20/2023, Normal      !! famotidine (PEPCID) 20 mg tablet Take 1 tablet (20 mg total) by mouth 2 (two) times a day, Starting Wed 10/18/2023, Normal      fluticasone (FLONASE) 50 mcg/act nasal spray 1 spray into each nostril daily, Starting Fri 10/21/2022, Normal      ketorolac (ACULAR) 0.5 % ophthalmic solution Administer 1 drop into the left eye every 6 (six) hours, Starting Fri 7/15/2022, Normal      lidocaine (Lidoderm) 5 % Apply 1 patch topically daily Remove & Discard patch within 12 hours or as directed by MD, Starting Fri 10/14/2022, Normal      methocarbamol (ROBAXIN) 500 mg tablet Take 1 tablet (500 mg total) by mouth 2 (two) times a day, Starting Wed 1/17/2024, Normal      !! naproxen (NAPROSYN) 500 mg tablet Take 1 tablet (500 mg total) by mouth 2 (two) times a day with meals, Starting Fri 10/14/2022, Normal      !! naproxen (Naprosyn) 500 mg tablet Take 1 tablet (500 mg total) by mouth 2 (two) times a day with meals, Starting Fri 10/21/2022, Normal      !! naproxen (Naprosyn) 500 mg tablet Take 1 tablet (500 mg total) by mouth 2 (two) times a day with meals, Starting Fri 7/14/2023, Normal      !! ondansetron (ZOFRAN-ODT) 4 mg disintegrating tablet Take 1 tablet (4 mg total) by mouth every 6 (six) hours as needed  for nausea or vomiting, Starting 2023, Normal       !! - Potential duplicate medications found. Please discuss with provider.        No discharge procedures on file.  Prior to Admission Medications   Prescriptions Last Dose Informant Patient Reported? Taking?   acetaminophen (TYLENOL) 500 mg tablet   No No   Sig: Take 2 tablets (1,000 mg total) by mouth every 8 (eight) hours as needed for moderate pain or mild pain   Patient not taking: Reported on 2024   baclofen 10 mg tablet   No No   Sig: Take 1 tablet (10 mg total) by mouth 3 (three) times a day   Patient not taking: Reported on 2023   chlorhexidine (PERIDEX) 0.12 % solution   No No   Sig: Apply 15 mL to the mouth or throat 2 (two) times a day   Patient not taking: Reported on 2024   dicyclomine (BENTYL) 20 mg tablet   No No   Sig: Take 1 tablet (20 mg total) by mouth 2 (two) times a day   Patient not taking: Reported on 2024   famotidine (PEPCID) 20 mg tablet   No No   Sig: Take 1 tablet (20 mg total) by mouth 2 (two) times a day   Patient not taking: Reported on 2024   fluticasone (FLONASE) 50 mcg/act nasal spray   No No   Si spray into each nostril daily   Patient not taking: Reported on 2023   fluticasone (FLONASE) 50 mcg/act nasal spray   No No   Si spray into each nostril daily for 3 days   ketorolac (ACULAR) 0.5 % ophthalmic solution   No No   Sig: Administer 1 drop into the left eye every 6 (six) hours   Patient not taking: Reported on 2023   lidocaine (Lidoderm) 5 %   No No   Sig: Apply 1 patch topically daily Remove & Discard patch within 12 hours or as directed by MD   Patient not taking: Reported on 2023   methocarbamol (ROBAXIN) 500 mg tablet   No No   Sig: Take 1 tablet (500 mg total) by mouth 2 (two) times a day   Patient not taking: Reported on 2024   naproxen (NAPROSYN) 500 mg tablet   No No   Sig: Take 1 tablet (500 mg total) by mouth 2 (two) times a day with meals   Patient not taking:  "Reported on 7/14/2023   naproxen (Naprosyn) 500 mg tablet   No No   Sig: Take 1 tablet (500 mg total) by mouth 2 (two) times a day with meals   Patient not taking: Reported on 7/14/2023   naproxen (Naprosyn) 500 mg tablet   No No   Sig: Take 1 tablet (500 mg total) by mouth 2 (two) times a day with meals   Patient not taking: Reported on 8/8/2024   ondansetron (ZOFRAN-ODT) 4 mg disintegrating tablet   No No   Sig: Take 1 tablet (4 mg total) by mouth every 6 (six) hours as needed for nausea or vomiting   Patient not taking: Reported on 8/8/2024      Facility-Administered Medications: None       Portions of the record may have been created with voice recognition software. Occasional wrong word or \"sound a like\" substitutions may have occurred due to the inherent limitations of voice recognition software. Read the chart carefully and recognize, using context, where substitutions have occurred.    Electronically signed by:  MD Juarez Rich MD  02/05/25 [id number]    "

## 2025-04-11 ENCOUNTER — HOSPITAL ENCOUNTER (EMERGENCY)
Facility: HOSPITAL | Age: 30
Discharge: HOME | End: 2025-04-11
Attending: EMERGENCY MEDICINE
Payer: COMMERCIAL

## 2025-04-11 VITALS
TEMPERATURE: 96 F | OXYGEN SATURATION: 100 % | HEART RATE: 60 BPM | BODY MASS INDEX: 18 KG/M2 | WEIGHT: 118.4 LBS | SYSTOLIC BLOOD PRESSURE: 113 MMHG | RESPIRATION RATE: 18 BRPM | DIASTOLIC BLOOD PRESSURE: 65 MMHG

## 2025-04-11 DIAGNOSIS — H18.603 KERATOCONUS OF BOTH EYES: ICD-10-CM

## 2025-04-11 DIAGNOSIS — H57.89 EYE IRRITATION: Primary | ICD-10-CM

## 2025-04-11 PROCEDURE — 99284 EMERGENCY DEPT VISIT MOD MDM: CPT | Performed by: EMERGENCY MEDICINE

## 2025-04-11 PROCEDURE — 99283 EMERGENCY DEPT VISIT LOW MDM: CPT

## 2025-04-11 RX ORDER — TETRACAINE HYDROCHLORIDE 5 MG/ML
1 SOLUTION OPHTHALMIC ONCE
Status: COMPLETED | OUTPATIENT
Start: 2025-04-11 | End: 2025-04-11

## 2025-04-11 RX ADMIN — FLUORESCEIN SODIUM 1 STRIP: 1 STRIP OPHTHALMIC at 22:31

## 2025-04-11 RX ADMIN — TETRACAINE HYDROCHLORIDE 1 DROP: 5 SOLUTION OPHTHALMIC at 22:31

## 2025-04-11 NOTE — Clinical Note
___________ accompanied Andrea Daniels to the emergency department on 4/11/2025.    Return date if applicable: 04/13/2025        If you have any questions or concerns, please don't hesitate to call.      Hugo Jones MD

## 2025-04-11 NOTE — Clinical Note
Andrea Daniels was seen and treated in our emergency department on 4/11/2025.                Diagnosis: Eye irritation    Andrea  may return to work on return date.    He may return on this date: 04/13/2025         If you have any questions or concerns, please don't hesitate to call.      Hugo Jones MD    ______________________________           _______________          _______________  Hospital Representative                              Date                                Time

## 2025-04-12 NOTE — DISCHARGE INSTRUCTIONS
At this time this the fluorescein stain did not show an abrasion.  You likely have some irritation from your contacts.  You can continue lubricating drops that you have.  Please follow-up with your primary care doctor as well as with your ophthalmologist.    Return to ER if you develop fever.

## 2025-04-12 NOTE — ED PROVIDER NOTES
Time reflects when diagnosis was documented in both MDM as applicable and the Disposition within this note       Time User Action Codes Description Comment    4/11/2025 10:40 PM Hugo Jones Add [H57.89] Eye irritation     4/11/2025 10:40 PM Hugo Jones Add [H18.603] Keratoconus of both eyes           ED Disposition       ED Disposition   Discharge    Condition   Stable    Date/Time   Fri Apr 11, 2025 10:40 PM    Comment   Andrea Daniels discharge to home/self care.                   Assessment & Plan       Medical Decision Making  30-year-old male presenting to the ED today for bilateral eye irritation.  Differential at this time includes corneal abrasion versus irritation secondary to contact use. Will do tetracaine and fluroscein.     No signs of corneal abrasion. Encouraged outpatient follow up with ophthalmologist.    Risk  Prescription drug management.             Medications   fluorescein sodium sterile ophthalmic strip 1 strip (1 strip Both Eyes Given 4/11/25 2231)   tetracaine 0.5 % ophthalmic solution 1 drop (1 drop Both Eyes Given 4/11/25 2231)       ED Risk Strat Scores                    No data recorded        SBIRT 20yo+      Flowsheet Row Most Recent Value   Initial Alcohol Screen: US AUDIT-C     1. How often do you have a drink containing alcohol? 1 Filed at: 04/11/2025 2221   2. How many drinks containing alcohol do you have on a typical day you are drinking?  0 Filed at: 04/11/2025 2221   3a. Male UNDER 65: How often do you have five or more drinks on one occasion? 0 Filed at: 04/11/2025 2221   Audit-C Score 1 Filed at: 04/11/2025 2221   JO: How many times in the past year have you...    Used an illegal drug or used a prescription medication for non-medical reasons? Never Filed at: 04/11/2025 2221                            History of Present Illness       Chief Complaint   Patient presents with    Eye Problem     States he has a condition called keratoconus and started with  redness , itching , pain both eyes 2-3 days ago with light sensitivity. Yesterday picked up Opcon A and has been using eye drops to lubricate. Patient wearing his contact lenses.        Past Medical History:   Diagnosis Date    Eye abnormality     Keratoconus of both eyes       Past Surgical History:   Procedure Laterality Date    EYE SURGERY Right       History reviewed. No pertinent family history.   Social History     Tobacco Use    Smoking status: Former     Types: Cigars    Smokeless tobacco: Never   Vaping Use    Vaping status: Never Used   Substance Use Topics    Alcohol use: Yes     Comment: rare    Drug use: Not Currently     Frequency: 7.0 times per week     Types: Marijuana     Comment: denies presently 08/2024      E-Cigarette/Vaping    E-Cigarette Use Never User       E-Cigarette/Vaping Substances    Nicotine No     THC No     CBD No     Flavoring No     Other No     Unknown No       I have reviewed and agree with the history as documented.     30-year-old male past medical see significant for critical illness presenting to the ED today for bilateral eye irritation.  This has been going on for the last 2 to 3 days.  Has light sensitivity.  Wears his contacts daily which are hard contacts worse criticalness.  Denying visual deficits at this time.  Has been using over-the-counter remedies without relief.        Review of Systems   Constitutional:  Negative for chills and fever.   HENT:  Negative for hearing loss.    Eyes:  Positive for redness. Negative for visual disturbance.   Respiratory:  Negative for shortness of breath.    Cardiovascular:  Negative for chest pain.   Gastrointestinal:  Negative for abdominal pain, constipation, diarrhea, nausea and vomiting.   Genitourinary:  Negative for difficulty urinating.   Musculoskeletal:  Negative for myalgias.   Skin:  Negative for rash.   Neurological:  Negative for dizziness.   Psychiatric/Behavioral:  Negative for agitation.    All other systems reviewed  and are negative.          Objective       ED Triage Vitals [04/11/25 2218]   Temperature Pulse Blood Pressure Respirations SpO2 Patient Position - Orthostatic VS   (!) 96 °F (35.6 °C) 60 113/65 18 100 % Sitting      Temp Source Heart Rate Source BP Location FiO2 (%) Pain Score    Tympanic Monitor Left arm -- 8      Vitals      Date and Time Temp Pulse SpO2 Resp BP Pain Score FACES Pain Rating User   04/11/25 2218 96 °F (35.6 °C) 60 100 % 18 113/65 8 -- SW            Physical Exam  Vitals and nursing note reviewed.   Constitutional:       General: He is not in acute distress.     Appearance: Normal appearance. He is not ill-appearing.   HENT:      Head: Normocephalic and atraumatic.      Right Ear: External ear normal.      Left Ear: External ear normal.      Nose: Nose normal. No congestion.      Mouth/Throat:      Mouth: Mucous membranes are moist.      Pharynx: Oropharynx is clear. No oropharyngeal exudate.   Eyes:      General: Lids are normal. Lids are everted, no foreign bodies appreciated.         Right eye: No discharge.         Left eye: No discharge.      Extraocular Movements: Extraocular movements intact.      Pupils: Pupils are equal, round, and reactive to light.      Comments: Conjunctival erythema present bilaterally.    No fluorescein uptake on examination.   Cardiovascular:      Rate and Rhythm: Normal rate and regular rhythm.      Pulses: Normal pulses.      Heart sounds: Normal heart sounds.   Pulmonary:      Effort: Pulmonary effort is normal. No respiratory distress.      Breath sounds: Normal breath sounds. No wheezing.   Abdominal:      General: Abdomen is flat. Bowel sounds are normal. There is no distension.      Palpations: Abdomen is soft.      Tenderness: There is no abdominal tenderness.   Musculoskeletal:         General: No swelling or deformity. Normal range of motion.      Cervical back: Normal range of motion. No rigidity.   Skin:     General: Skin is warm and dry.      Capillary  Refill: Capillary refill takes less than 2 seconds.   Neurological:      General: No focal deficit present.      Mental Status: He is alert and oriented to person, place, and time. Mental status is at baseline.      Cranial Nerves: No cranial nerve deficit.      Motor: No weakness.      Gait: Gait normal.   Psychiatric:         Mood and Affect: Mood normal.         Behavior: Behavior normal.         Results Reviewed       None            No orders to display       Procedures    ED Medication and Procedure Management   Prior to Admission Medications   Prescriptions Last Dose Informant Patient Reported? Taking?   acetaminophen (TYLENOL) 500 mg tablet   No No   Sig: Take 2 tablets (1,000 mg total) by mouth every 8 (eight) hours as needed for moderate pain or mild pain   Patient not taking: Reported on 2024   baclofen 10 mg tablet   No No   Sig: Take 1 tablet (10 mg total) by mouth 3 (three) times a day   Patient not taking: Reported on 2023   chlorhexidine (PERIDEX) 0.12 % solution   No No   Sig: Apply 15 mL to the mouth or throat 2 (two) times a day   Patient not taking: Reported on 2024   dicyclomine (BENTYL) 20 mg tablet   No No   Sig: Take 1 tablet (20 mg total) by mouth 2 (two) times a day   Patient not taking: Reported on 2024   dicyclomine (BENTYL) 20 mg tablet   No No   Sig: Take 1 tablet (20 mg total) by mouth 2 (two) times a day   famotidine (PEPCID) 20 mg tablet   No No   Sig: Take 1 tablet (20 mg total) by mouth 2 (two) times a day   Patient not taking: Reported on 2024   famotidine (PEPCID) 20 mg tablet   No No   Sig: Take 1 tablet (20 mg total) by mouth 2 (two) times a day for 5 days   fluticasone (FLONASE) 50 mcg/act nasal spray   No No   Si spray into each nostril daily   Patient not taking: Reported on 2023   fluticasone (FLONASE) 50 mcg/act nasal spray   No No   Si spray into each nostril daily for 3 days   ketorolac (ACULAR) 0.5 % ophthalmic solution   No No   Sig:  Administer 1 drop into the left eye every 6 (six) hours   Patient not taking: Reported on 7/14/2023   lidocaine (Lidoderm) 5 %   No No   Sig: Apply 1 patch topically daily Remove & Discard patch within 12 hours or as directed by MD   Patient not taking: Reported on 7/14/2023   loperamide (IMODIUM) 2 mg capsule   No No   Sig: Take 1 capsule (2 mg total) by mouth 4 (four) times a day as needed for diarrhea   methocarbamol (ROBAXIN) 500 mg tablet   No No   Sig: Take 1 tablet (500 mg total) by mouth 2 (two) times a day   Patient not taking: Reported on 8/8/2024   naproxen (NAPROSYN) 500 mg tablet   No No   Sig: Take 1 tablet (500 mg total) by mouth 2 (two) times a day with meals   Patient not taking: Reported on 7/14/2023   naproxen (Naprosyn) 500 mg tablet   No No   Sig: Take 1 tablet (500 mg total) by mouth 2 (two) times a day with meals   Patient not taking: Reported on 7/14/2023   naproxen (Naprosyn) 500 mg tablet   No No   Sig: Take 1 tablet (500 mg total) by mouth 2 (two) times a day with meals   Patient not taking: Reported on 8/8/2024   naproxen (Naprosyn) 500 mg tablet   No No   Sig: Take 1 tablet (500 mg total) by mouth 2 (two) times a day with meals   ondansetron (ZOFRAN-ODT) 4 mg disintegrating tablet   No No   Sig: Take 1 tablet (4 mg total) by mouth every 6 (six) hours as needed for nausea or vomiting   Patient not taking: Reported on 8/8/2024   ondansetron (ZOFRAN-ODT) 4 mg disintegrating tablet   No No   Sig: Take 1 tablet (4 mg total) by mouth every 8 (eight) hours as needed for nausea or vomiting      Facility-Administered Medications: None     Discharge Medication List as of 4/11/2025 10:44 PM        CONTINUE these medications which have NOT CHANGED    Details   acetaminophen (TYLENOL) 500 mg tablet Take 2 tablets (1,000 mg total) by mouth every 8 (eight) hours as needed for moderate pain or mild pain, Starting Wed 9/20/2023, Normal      baclofen 10 mg tablet Take 1 tablet (10 mg total) by mouth 3  (three) times a day, Starting Fri 10/14/2022, Normal      chlorhexidine (PERIDEX) 0.12 % solution Apply 15 mL to the mouth or throat 2 (two) times a day, Starting Fri 7/14/2023, Normal      !! dicyclomine (BENTYL) 20 mg tablet Take 1 tablet (20 mg total) by mouth 2 (two) times a day, Starting Wed 9/20/2023, Normal      !! dicyclomine (BENTYL) 20 mg tablet Take 1 tablet (20 mg total) by mouth 2 (two) times a day, Starting Wed 2/5/2025, Normal      famotidine (PEPCID) 20 mg tablet Take 1 tablet (20 mg total) by mouth 2 (two) times a day, Starting Wed 10/18/2023, Normal      fluticasone (FLONASE) 50 mcg/act nasal spray 1 spray into each nostril daily, Starting Fri 10/21/2022, Normal      ketorolac (ACULAR) 0.5 % ophthalmic solution Administer 1 drop into the left eye every 6 (six) hours, Starting Fri 7/15/2022, Normal      lidocaine (Lidoderm) 5 % Apply 1 patch topically daily Remove & Discard patch within 12 hours or as directed by MD, Starting Fri 10/14/2022, Normal      loperamide (IMODIUM) 2 mg capsule Take 1 capsule (2 mg total) by mouth 4 (four) times a day as needed for diarrhea, Starting Wed 2/5/2025, Normal      methocarbamol (ROBAXIN) 500 mg tablet Take 1 tablet (500 mg total) by mouth 2 (two) times a day, Starting Wed 1/17/2024, Normal      !! naproxen (NAPROSYN) 500 mg tablet Take 1 tablet (500 mg total) by mouth 2 (two) times a day with meals, Starting Fri 10/14/2022, Normal      !! naproxen (Naprosyn) 500 mg tablet Take 1 tablet (500 mg total) by mouth 2 (two) times a day with meals, Starting Fri 10/21/2022, Normal      !! naproxen (Naprosyn) 500 mg tablet Take 1 tablet (500 mg total) by mouth 2 (two) times a day with meals, Starting Fri 7/14/2023, Normal      !! naproxen (Naprosyn) 500 mg tablet Take 1 tablet (500 mg total) by mouth 2 (two) times a day with meals, Starting Wed 2/5/2025, Normal      !! ondansetron (ZOFRAN-ODT) 4 mg disintegrating tablet Take 1 tablet (4 mg total) by mouth every 6 (six)  hours as needed for nausea or vomiting, Starting Wed 9/20/2023, Normal      !! ondansetron (ZOFRAN-ODT) 4 mg disintegrating tablet Take 1 tablet (4 mg total) by mouth every 8 (eight) hours as needed for nausea or vomiting, Starting Wed 2/5/2025, Normal       !! - Potential duplicate medications found. Please discuss with provider.        No discharge procedures on file.  ED SEPSIS DOCUMENTATION   Time reflects when diagnosis was documented in both MDM as applicable and the Disposition within this note       Time User Action Codes Description Comment    4/11/2025 10:40 PM Hugo Jones [H57.89] Eye irritation     4/11/2025 10:40 PM Hugo Jones [H18.603] Keratoconus of both eyes                  Hugo Jones MD  04/11/25 8963

## 2025-04-15 ENCOUNTER — HOSPITAL ENCOUNTER (EMERGENCY)
Facility: HOSPITAL | Age: 30
Discharge: HOME | End: 2025-04-15
Attending: EMERGENCY MEDICINE
Payer: COMMERCIAL

## 2025-04-15 VITALS
TEMPERATURE: 98.6 F | SYSTOLIC BLOOD PRESSURE: 122 MMHG | HEART RATE: 52 BPM | OXYGEN SATURATION: 100 % | RESPIRATION RATE: 20 BRPM | DIASTOLIC BLOOD PRESSURE: 70 MMHG

## 2025-04-15 DIAGNOSIS — R52 BODY ACHES: ICD-10-CM

## 2025-04-15 DIAGNOSIS — R68.89 FLU-LIKE SYMPTOMS: Primary | ICD-10-CM

## 2025-04-15 LAB
FLUAV AG UPPER RESP QL IA.RAPID: NEGATIVE
FLUBV AG UPPER RESP QL IA.RAPID: NEGATIVE
SARS-COV+SARS-COV-2 AG RESP QL IA.RAPID: POSITIVE

## 2025-04-15 PROCEDURE — 96372 THER/PROPH/DIAG INJ SC/IM: CPT

## 2025-04-15 PROCEDURE — 87804 INFLUENZA ASSAY W/OPTIC: CPT | Performed by: EMERGENCY MEDICINE

## 2025-04-15 PROCEDURE — 99283 EMERGENCY DEPT VISIT LOW MDM: CPT

## 2025-04-15 PROCEDURE — 87811 SARS-COV-2 COVID19 W/OPTIC: CPT | Performed by: EMERGENCY MEDICINE

## 2025-04-15 PROCEDURE — 99284 EMERGENCY DEPT VISIT MOD MDM: CPT | Performed by: EMERGENCY MEDICINE

## 2025-04-15 RX ORDER — NAPROXEN 500 MG/1
500 TABLET ORAL 2 TIMES DAILY WITH MEALS
Qty: 30 TABLET | Refills: 0 | Status: SHIPPED | OUTPATIENT
Start: 2025-04-15

## 2025-04-15 RX ORDER — KETOROLAC TROMETHAMINE 30 MG/ML
15 INJECTION, SOLUTION INTRAMUSCULAR; INTRAVENOUS ONCE
Status: COMPLETED | OUTPATIENT
Start: 2025-04-15 | End: 2025-04-15

## 2025-04-15 RX ADMIN — KETOROLAC TROMETHAMINE 15 MG: 30 INJECTION, SOLUTION INTRAMUSCULAR; INTRAVENOUS at 22:43

## 2025-04-15 NOTE — Clinical Note
accompanied Andrea Daniels to the emergency department on 4/15/2025.    Return date if applicable: 04/16/2025        If you have any questions or concerns, please don't hesitate to call.      Harvey Qiu MD

## 2025-04-15 NOTE — Clinical Note
Andrea Daniels was seen and treated in our emergency department on 4/15/2025.                Diagnosis:     Andrea  .    He may return on this date: 04/16/2025         If you have any questions or concerns, please don't hesitate to call.      Harvey Qiu MD    ______________________________           _______________          _______________  Hospital Representative                              Date                                Time

## 2025-04-16 ENCOUNTER — RESULTS FOLLOW-UP (OUTPATIENT)
Dept: EMERGENCY DEPT | Facility: HOSPITAL | Age: 30
End: 2025-04-16

## 2025-04-16 NOTE — ED PROVIDER NOTES
Time reflects when diagnosis was documented in both MDM as applicable and the Disposition within this note       Time User Action Codes Description Comment    4/15/2025 10:37 PM Harvey Qiu Add [R68.89] Flu-like symptoms     4/15/2025 10:37 PM Harvey Qiu Add [R52] Body aches           ED Disposition       ED Disposition   Discharge    Condition   Stable    Date/Time   Tue Apr 15, 2025 10:37 PM    Comment   Andrea Carlos Alberto Givensle discharge to home/self care.                   Assessment & Plan       Medical Decision Making  Patient presenting with flulike symptoms.  Well-appearing, afebrile with grossly normal vital signs, nonfocal pulmonary exam.  Treated with Toradol, provided with prescription for Naprosyn.  I ordered a COVID and flu swab.  I discharged patient with return precautions.    Amount and/or Complexity of Data Reviewed  Labs: ordered.    Risk  Prescription drug management.             Medications   ketorolac (TORADOL) injection 15 mg (has no administration in time range)       ED Risk Strat Scores                    No data recorded                            History of Present Illness       Chief Complaint   Patient presents with    Flu Symptoms     PT.  Reports since Friday he started getting body aches, fever, chills, and sweats.       Past Medical History:   Diagnosis Date    Eye abnormality     Keratoconus of both eyes       Past Surgical History:   Procedure Laterality Date    EYE SURGERY Right       No family history on file.   Social History     Tobacco Use    Smoking status: Former     Types: Cigars    Smokeless tobacco: Never   Vaping Use    Vaping status: Never Used   Substance Use Topics    Alcohol use: Yes     Comment: rare    Drug use: Not Currently     Frequency: 7.0 times per week     Types: Marijuana     Comment: denies presently 08/2024      E-Cigarette/Vaping    E-Cigarette Use Never User       E-Cigarette/Vaping Substances    Nicotine No     THC No     CBD No      Flavoring No     Other No     Unknown No       I have reviewed and agree with the history as documented.     Patient is a 30-year-old male presenting for evaluation of several days of headache, body aches, subjective fevers, chills, rhinorrhea.  Patient's significant other is present and complains of similar symptoms.  Patient denies significant cough, shortness of breath.  Patient eating, drinking, urinating, stooling normally.        Review of Systems   Constitutional:  Positive for chills and fever. Negative for fatigue.   HENT:  Positive for rhinorrhea.    Respiratory:  Negative for cough and shortness of breath.    Gastrointestinal:  Negative for diarrhea, nausea and vomiting.   Musculoskeletal:  Positive for arthralgias and myalgias.   Neurological:  Positive for headaches.   Psychiatric/Behavioral:  Negative for confusion.    All other systems reviewed and are negative.          Objective       ED Triage Vitals [04/15/25 2223]   Temperature Pulse Blood Pressure Respirations SpO2 Patient Position - Orthostatic VS   98.6 °F (37 °C) (!) 52 122/70 20 100 % Sitting      Temp Source Heart Rate Source BP Location FiO2 (%) Pain Score    Oral Monitor Right arm -- --      Vitals      Date and Time Temp Pulse SpO2 Resp BP Pain Score FACES Pain Rating User   04/15/25 2223 98.6 °F (37 °C) 52 100 % 20 122/70 -- -- CS            Physical Exam  Vitals and nursing note reviewed.   Constitutional:       General: He is not in acute distress.     Appearance: Normal appearance. He is not ill-appearing, toxic-appearing or diaphoretic.      Comments: Well-appearing, nontoxic, nondistressed   HENT:      Head: Normocephalic and atraumatic.      Comments: Moist mucous membranes     Right Ear: External ear normal.      Left Ear: External ear normal.   Eyes:      General:         Right eye: No discharge.         Left eye: No discharge.   Cardiovascular:      Comments: Regular rate and rhythm, no murmurs rubs or gallops  Pulmonary:       Effort: No respiratory distress.      Comments: No increased work of breathing.  Speaking in complete sentences.  Lungs clear to auscultation bilaterally without wheezes, rales, rhonchi  Abdominal:      General: There is no distension.      Comments: Abdomen soft, nontender, nondistended without rigidity, rebound or guarding   Musculoskeletal:         General: No deformity.      Cervical back: Normal range of motion.   Skin:     Findings: No lesion or rash.   Neurological:      Mental Status: He is alert and oriented to person, place, and time. Mental status is at baseline.      Comments: Awake, alert, pleasant, interactive   Psychiatric:         Mood and Affect: Mood and affect normal.         Results Reviewed       Procedure Component Value Units Date/Time    FLU/COVID Rapid Antigen (30 min. TAT) - Preferred screening test in ED [[id number]] Collected: 04/15/25 2242    Lab Status: No result Specimen: Nares from Nose             No orders to display       Procedures    ED Medication and Procedure Management   Prior to Admission Medications   Prescriptions Last Dose Informant Patient Reported? Taking?   acetaminophen (TYLENOL) 500 mg tablet   No No   Sig: Take 2 tablets (1,000 mg total) by mouth every 8 (eight) hours as needed for moderate pain or mild pain   Patient not taking: Reported on 8/8/2024   baclofen 10 mg tablet   No No   Sig: Take 1 tablet (10 mg total) by mouth 3 (three) times a day   Patient not taking: Reported on 7/14/2023   chlorhexidine (PERIDEX) 0.12 % solution   No No   Sig: Apply 15 mL to the mouth or throat 2 (two) times a day   Patient not taking: Reported on 8/8/2024   dicyclomine (BENTYL) 20 mg tablet   No No   Sig: Take 1 tablet (20 mg total) by mouth 2 (two) times a day   Patient not taking: Reported on 8/8/2024   dicyclomine (BENTYL) 20 mg tablet   No No   Sig: Take 1 tablet (20 mg total) by mouth 2 (two) times a day   famotidine (PEPCID) 20 mg tablet   No No   Sig: Take 1 tablet (20 mg  total) by mouth 2 (two) times a day   Patient not taking: Reported on 2024   famotidine (PEPCID) 20 mg tablet   No No   Sig: Take 1 tablet (20 mg total) by mouth 2 (two) times a day for 5 days   fluticasone (FLONASE) 50 mcg/act nasal spray   No No   Si spray into each nostril daily   Patient not taking: Reported on 2023   fluticasone (FLONASE) 50 mcg/act nasal spray   No No   Si spray into each nostril daily for 3 days   ketorolac (ACULAR) 0.5 % ophthalmic solution   No No   Sig: Administer 1 drop into the left eye every 6 (six) hours   Patient not taking: Reported on 2023   lidocaine (Lidoderm) 5 %   No No   Sig: Apply 1 patch topically daily Remove & Discard patch within 12 hours or as directed by MD   Patient not taking: Reported on 2023   loperamide (IMODIUM) 2 mg capsule   No No   Sig: Take 1 capsule (2 mg total) by mouth 4 (four) times a day as needed for diarrhea   methocarbamol (ROBAXIN) 500 mg tablet   No No   Sig: Take 1 tablet (500 mg total) by mouth 2 (two) times a day   Patient not taking: Reported on 2024   naproxen (NAPROSYN) 500 mg tablet   No No   Sig: Take 1 tablet (500 mg total) by mouth 2 (two) times a day with meals   Patient not taking: Reported on 2023   naproxen (Naprosyn) 500 mg tablet   No No   Sig: Take 1 tablet (500 mg total) by mouth 2 (two) times a day with meals   Patient not taking: Reported on 2023   naproxen (Naprosyn) 500 mg tablet   No No   Sig: Take 1 tablet (500 mg total) by mouth 2 (two) times a day with meals   Patient not taking: Reported on 2024   naproxen (Naprosyn) 500 mg tablet   No No   Sig: Take 1 tablet (500 mg total) by mouth 2 (two) times a day with meals   ondansetron (ZOFRAN-ODT) 4 mg disintegrating tablet   No No   Sig: Take 1 tablet (4 mg total) by mouth every 6 (six) hours as needed for nausea or vomiting   Patient not taking: Reported on 2024   ondansetron (ZOFRAN-ODT) 4 mg disintegrating tablet   No No   Sig:  Take 1 tablet (4 mg total) by mouth every 8 (eight) hours as needed for nausea or vomiting      Facility-Administered Medications: None     Patient's Medications   Discharge Prescriptions    NAPROXEN (NAPROSYN) 500 MG TABLET    Take 1 tablet (500 mg total) by mouth 2 (two) times a day with meals       Start Date: 4/15/2025 End Date: --       Order Dose: 500 mg       Quantity: 30 tablet    Refills: 0     No discharge procedures on file.  ED SEPSIS DOCUMENTATION   Time reflects when diagnosis was documented in both MDM as applicable and the Disposition within this note       Time User Action Codes Description Comment    4/15/2025 10:37 PM Harvey Qiu [R68.89] Flu-like symptoms     4/15/2025 10:37 PM Harvey Qiu [R52] Body aches                  Harvey Qiu MD  04/15/25 4727

## 2025-05-19 PROCEDURE — 99284 EMERGENCY DEPT VISIT MOD MDM: CPT | Performed by: EMERGENCY MEDICINE

## 2025-05-19 PROCEDURE — 99283 EMERGENCY DEPT VISIT LOW MDM: CPT

## 2025-05-20 ENCOUNTER — HOSPITAL ENCOUNTER (EMERGENCY)
Facility: HOSPITAL | Age: 30
Discharge: HOME | End: 2025-05-20
Attending: EMERGENCY MEDICINE
Payer: COMMERCIAL

## 2025-05-20 VITALS
SYSTOLIC BLOOD PRESSURE: 137 MMHG | OXYGEN SATURATION: 98 % | TEMPERATURE: 98 F | DIASTOLIC BLOOD PRESSURE: 83 MMHG | HEART RATE: 83 BPM | RESPIRATION RATE: 19 BRPM

## 2025-05-20 DIAGNOSIS — H18.609 KERATOCONUS: Primary | ICD-10-CM

## 2025-05-20 DIAGNOSIS — H10.9 CONJUNCTIVITIS: ICD-10-CM

## 2025-05-20 LAB
FLUAV AG UPPER RESP QL IA.RAPID: NEGATIVE
FLUBV AG UPPER RESP QL IA.RAPID: NEGATIVE
S PYO DNA THROAT QL NAA+PROBE: NOT DETECTED
SARS-COV+SARS-COV-2 AG RESP QL IA.RAPID: NEGATIVE

## 2025-05-20 PROCEDURE — 87811 SARS-COV-2 COVID19 W/OPTIC: CPT | Performed by: EMERGENCY MEDICINE

## 2025-05-20 PROCEDURE — 87651 STREP A DNA AMP PROBE: CPT | Performed by: EMERGENCY MEDICINE

## 2025-05-20 PROCEDURE — 87804 INFLUENZA ASSAY W/OPTIC: CPT | Performed by: EMERGENCY MEDICINE

## 2025-05-20 RX ORDER — DIPHENHYDRAMINE HCL 25 MG
25 TABLET ORAL
Qty: 20 TABLET | Refills: 0 | Status: SHIPPED | OUTPATIENT
Start: 2025-05-20

## 2025-05-20 RX ORDER — OFLOXACIN 3 MG/ML
1 SOLUTION/ DROPS OPHTHALMIC EVERY 4 HOURS
Qty: 5 ML | Refills: 0 | Status: SHIPPED | OUTPATIENT
Start: 2025-05-20

## 2025-05-20 RX ORDER — KETOROLAC TROMETHAMINE 5 MG/ML
1 SOLUTION OPHTHALMIC 4 TIMES DAILY
Status: DISCONTINUED | OUTPATIENT
Start: 2025-05-20 | End: 2025-05-20 | Stop reason: HOSPADM

## 2025-05-20 RX ORDER — KETOTIFEN FUMARATE 0.35 MG/ML
1 SOLUTION/ DROPS OPHTHALMIC ONCE
Status: DISCONTINUED | OUTPATIENT
Start: 2025-05-20 | End: 2025-05-20 | Stop reason: HOSPADM

## 2025-05-20 RX ORDER — CETIRIZINE HYDROCHLORIDE, PSEUDOEPHEDRINE HYDROCHLORIDE 5; 120 MG/1; MG/1
1 TABLET, FILM COATED, EXTENDED RELEASE ORAL 2 TIMES DAILY
Qty: 28 TABLET | Refills: 0 | Status: SHIPPED | OUTPATIENT
Start: 2025-05-20 | End: 2025-06-03

## 2025-05-20 RX ORDER — KETOTIFEN FUMARATE 0.35 MG/ML
1 SOLUTION/ DROPS OPHTHALMIC 2 TIMES DAILY
Qty: 10 ML | Refills: 0 | Status: SHIPPED | OUTPATIENT
Start: 2025-05-20

## 2025-05-20 NOTE — Clinical Note
Nba March accompanied Andrea Carcamo Ellis Mclain to the emergency department on 5/19/2025.    Return date if applicable:         If you have any questions or concerns, please don't hesitate to call.      Luz Elena Ma RN

## 2025-05-20 NOTE — Clinical Note
Andrea Carlos Alberto Daniels was seen and treated in our emergency department on 5/19/2025.                Diagnosis:     Andrea  .    He may return on this date:          If you have any questions or concerns, please don't hesitate to call.      Lzu Elena Ma RN    ______________________________           _______________          _______________  Hospital Representative                              Date                                Time

## 2025-05-20 NOTE — DISCHARGE INSTRUCTIONS
Continue oflox - I resprescribed it    Use ketorolac twice a day as needed for inflammation discomfort    Be out of contacts as much as you can    Use ketotifen for the eye itching    Add oral antihistamine for allergies and benadryl at night    Follow with your ophthalmologist promptly

## 2025-05-20 NOTE — Clinical Note
Andrea Carlos Alberto Daniels was seen and treated in our emergency department on 5/19/2025.                Diagnosis:     Andrea  .    He may return on this date:          If you have any questions or concerns, please don't hesitate to call.      Luz Elena Ma RN    ______________________________           _______________          _______________  Hospital Representative                              Date                                Time

## 2025-05-21 NOTE — ED PROVIDER NOTES
Final Diagnosis:  1. Keratoconus    2. Conjunctivitis        Chief Complaint   Patient presents with    Flu Symptoms     Pt reports b/l eye redness and coughing/phlegm for past few days.        HPI  Pt has keratoconus    He's having some erythema around contacts he always wears.   No glasses    On ofloxin  On ocular steroids prescribed by ophtho    Also rhinorrhea,   Itching eyes  Sore throat    No fever chills    No visual change from baseline    No trauma to eyes.   No drainage        EMS additionally reports:     - Previous charting underwent limited review with attention to last ED visits, labs, ekgs, and prior imaging.  Chart review reveals :     Admission on 04/15/2025, Discharged on 04/15/2025   Component Date Value Ref Range Status    SARS COV Rapid Antigen 04/15/2025 Positive (A)  Negative Final    Influenza A Rapid Antigen 04/15/2025 Negative  Negative Final    Influenza B Rapid Antigen 04/15/2025 Negative  Negative Final       - No language barrier.   - History obtained from patient    - Discuss patient's care, with patient permission or by chart review, with      PMH:   has a past medical history of Eye abnormality and Keratoconus of both eyes.    PSH:   has a past surgical history that includes EYE SURGERY (Right).     Social History:  Tobacco Use: Medium Risk (5/20/2025)    Patient History     Smoking Tobacco Use: Former     Smokeless Tobacco Use: Never     Passive Exposure: Not on file     Alcohol Use: Not on file     No illicit use       ROS:  Pertinent positives/negatives: .     Some ROS may be present in the HPI and would take precedent over these standard questions asked below.   Review of Systems   Constitutional:  Negative for chills and fever.   HENT:  Positive for congestion, postnasal drip, rhinorrhea and sore throat.    Eyes:  Positive for redness and itching. Negative for photophobia and visual disturbance.        CONSTITUTIONAL:  No lethargy. No unexpected weight loss. No change in  behavior.  EYES:  No pain, redness, or discharge. No loss of vision. No orbital trauma or pain.   ENT:  No tinnitus or decreased hearing. No epistaxis/purulent rhinorrhea. No voice change, airway closing, trismus.   CARDIOVASCULAR:  No chest pain. No skin mottling or pallor. No change in exertional capacity  RESPIRATORY:  No hemoptysis. No paroxysmal nocturnal dyspnea. No stridor. No apnea or bluing.   GASTROINTESTINAL:  No vomiting, diarrhea. No distension. No melena. No hematochezia.   GENITOURINARY:  No nocturia. No hematuria or foul smelling or cloudy urine. No discharge. No sores/adenopathy.   MUSCULOSKELETAL:  No contracture.  No new deformity.   INTEGUMENTARY:  No swelling. No unexpected contusions. No abrasions. No lymphangitis.  NEUROLOGIC:  No meningismus. No new numbness of the extremities. No new focal weakness. No postural instability  PSYCHIATRIC:  No SI HI AVH  HEMATOLOGICAL:  No bleeding. No petechiae. No bruising.  ALLERGIES:  No urticaria. No sudden abd cramping. No stridor.    PE:     Physical exam highlights:   Physical Exam       Vitals:    05/20/25 0008   BP: 137/83   BP Location: Right arm   Pulse: 83   Resp: 19   Temp: 98 °F (36.7 °C)   TempSrc: Oral   SpO2: 98%     Vitals reviewed by me.   Nursing note reviewed  Chaperone present for all sensitive exam.  Const: No acute distress. Alert. Nontoxic. Not diaphoretic.    HEENT: External ears normal. No protrusion drainage swelling. Nose normal. No drainage/traumatic deformity. MM. Mouth with baseline/symmetric movement. No trismus.   Eyes: No squinting. No icterus. No tearing/swelling/drainage. Tracks through the room with normal EOM. No purulence. Erythema surrounding contacts. Shape c/w known keratoconus. No change in acuity. Field sintact.   Neck: ROM normal. No rigidity. No meningismus.  Cards: Rate as per vitals Compared to monitor sinus unless documented. Regular Well perfused.  Pulm: Effort and excursion normal. No distress. No audible  wheezing/no stridor. Normal resp rate without retraction or change in work of breathing.  Abd: No distension beyond baseline. No fluctuant wave. Patient without peritoneal pain with shifting/bumping the bed.   MSK: ROM normal baseline. No deformity. No contractures from baseline.   Skin: No new rashes visible. Well perfused. No wounds visualized on exposed skin  Neuro: Nonfocal. Baseline. CN grossly intact. Moving all four with coordination.   Psych: Normal behavior and affect.        A:  - Nursing note reviewed.    Ddx and MDM  Considered diagnoses    Toradol irritation assoc w/ contacts    Continue ofloxin  Represcribed    Conjunctivitis > scleritis    Uveitis, iritis on ddx but lower based on exam and location of erythema surrounding contacts.   Encouraged to take breaks from    Allergic conj likely given other symptoms  Add ketotifen    Add other antihistamine    Test for strep cov flu  Neg          Dispo decision       My conversation with consultant reveals:        Decision rules:                           My read of the XR/CT scan reveals:     No orders to display       Orders Placed This Encounter   Procedures    FLU/COVID Rapid Antigen (30 min. TAT) - Preferred screening test in ED    Strep A PCR     Labs Reviewed   COVID-19/INFLUENZA A/B RAPID ANTIGEN (30 MIN.TAT) - Normal       Result Value Ref Range Status    SARS COV Rapid Antigen Negative  Negative Final    Influenza A Rapid Antigen Negative  Negative Final    Influenza B Rapid Antigen Negative  Negative Final    Narrative:     This test has been performed using the [employer] [facility] 2 FLU+SARS Antigen test under the Emergency Use Authorization (EUA). This test has been validated by the  and verified by the performing laboratory. The Claire uses lateral flow immunofluorescent sandwich assay to detect SARS-COV, Influenza A and Influenza B Antigen.     The [employer] [facility] 2 SARS Antigen test does not differentiate between SARS-CoV and SARS-CoV-2.      Negative results are presumptive and may be confirmed with a molecular assay, if necessary, for patient management. Negative results do not rule out SARS-CoV-2 or influenza infection and should not be used as the sole basis for treatment or patient management decisions. A negative test result may occur if the level of antigen in a sample is below the limit of detection of this test.     Positive results are indicative of the presence of viral antigens, but do not rule out bacterial infection or co-infection with other viruses.     All test results should be used as an adjunct to clinical observations and other information available to the provider.    FOR PEDIATRIC PATIENTS - copy/paste COVID Guidelines URL to browser: [email or web address]   STREP A PCR - Normal    STREP A PCR Not Detected  Not Detected Final       *Each of these labs was reviewed. Particular standout labs will be noted in the ED Course above     Final Diagnosis:  1. Keratoconus    2. Conjunctivitis          P:  - hospital tx includes   Medications - No data to display      - disposition  Time reflects when diagnosis was documented in both MDM as applicable and the Disposition within this note       Time User Action Codes Description Comment    5/20/2025 12:40 AM Juarez Marie Add [H18.609] Keratoconus     5/20/2025 12:40 AM Juarez Marie Add [H10.9] Conjunctivitis           ED Disposition       ED Disposition   Discharge    Condition   Stable    Date/Time   Tue May 20, 2025 12:40 AM    Comment   Andrea Daniels discharge to home/self care.                   Follow-up Information    None         - patient will call their PCP to let them know they were in the emergency department. We discuss return precautions and patient is agreeable with plan and aformentioned disposition.       - additional treatment intended, if consistent with primary provider:  - patient to follow with  :      Discharge Medication List as of 5/20/2025 12:50 AM        START taking these medications    Details   cetirizine-pseudoephedrine (ZyrTEC-D) 5-120 MG per tablet Take 1 tablet by mouth 2 (two) times a day for 14 days, Starting Tue 5/20/2025, Until Tue 6/3/2025, Normal      diphenhydrAMINE (BENADRYL) 25 mg tablet Take 1 tablet (25 mg total) by mouth daily at bedtime as needed for itching, Starting Tue 5/20/2025, Normal      Ketotifen Fumarate (ZADITOR) 0.035 % ophthalmic solution Administer 1 drop to the right eye 2 (two) times a day, Starting Tue 5/20/2025, Normal      ofloxacin (OCUFLOX) 0.3 % ophthalmic solution Administer 1 drop to both eyes every 4 (four) hours, Starting Tue 5/20/2025, Normal           CONTINUE these medications which have NOT CHANGED    Details   acetaminophen (TYLENOL) 500 mg tablet Take 2 tablets (1,000 mg total) by mouth every 8 (eight) hours as needed for moderate pain or mild pain, Starting Wed 9/20/2023, Normal      baclofen 10 mg tablet Take 1 tablet (10 mg total) by mouth 3 (three) times a day, Starting Fri 10/14/2022, Normal      chlorhexidine (PERIDEX) 0.12 % solution Apply 15 mL to the mouth or throat 2 (two) times a day, Starting Fri 7/14/2023, Normal      !! dicyclomine (BENTYL) 20 mg tablet Take 1 tablet (20 mg total) by mouth 2 (two) times a day, Starting Wed 9/20/2023, Normal      !! dicyclomine (BENTYL) 20 mg tablet Take 1 tablet (20 mg total) by mouth 2 (two) times a day, Starting Wed 2/5/2025, Normal      famotidine (PEPCID) 20 mg tablet Take 1 tablet (20 mg total) by mouth 2 (two) times a day, Starting Wed 10/18/2023, Normal      fluticasone (FLONASE) 50 mcg/act nasal spray 1 spray into each nostril daily, Starting Fri 10/21/2022, Normal      ketorolac (ACULAR) 0.5 % ophthalmic solution Administer 1 drop into the left eye every 6 (six) hours, Starting Fri 7/15/2022, Normal      lidocaine (Lidoderm) 5 % Apply 1 patch topically daily Remove & Discard patch within 12 hours  or as directed by MD, Starting Fri 10/14/2022, Normal      loperamide (IMODIUM) 2 mg capsule Take 1 capsule (2 mg total) by mouth 4 (four) times a day as needed for diarrhea, Starting Wed 2/5/2025, Normal      methocarbamol (ROBAXIN) 500 mg tablet Take 1 tablet (500 mg total) by mouth 2 (two) times a day, Starting Wed 1/17/2024, Normal      !! naproxen (NAPROSYN) 500 mg tablet Take 1 tablet (500 mg total) by mouth 2 (two) times a day with meals, Starting Fri 10/14/2022, Normal      !! naproxen (Naprosyn) 500 mg tablet Take 1 tablet (500 mg total) by mouth 2 (two) times a day with meals, Starting Fri 10/21/2022, Normal      !! naproxen (Naprosyn) 500 mg tablet Take 1 tablet (500 mg total) by mouth 2 (two) times a day with meals, Starting Fri 7/14/2023, Normal      !! naproxen (Naprosyn) 500 mg tablet Take 1 tablet (500 mg total) by mouth 2 (two) times a day with meals, Starting Wed 2/5/2025, Normal      !! naproxen (Naprosyn) 500 mg tablet Take 1 tablet (500 mg total) by mouth 2 (two) times a day with meals, Starting Tue 4/15/2025, Normal      !! ondansetron (ZOFRAN-ODT) 4 mg disintegrating tablet Take 1 tablet (4 mg total) by mouth every 6 (six) hours as needed for nausea or vomiting, Starting Wed 9/20/2023, Normal      !! ondansetron (ZOFRAN-ODT) 4 mg disintegrating tablet Take 1 tablet (4 mg total) by mouth every 8 (eight) hours as needed for nausea or vomiting, Starting Wed 2/5/2025, Normal       !! - Potential duplicate medications found. Please discuss with provider.        No discharge procedures on file.  Prior to Admission Medications   Prescriptions Last Dose Informant Patient Reported? Taking?   acetaminophen (TYLENOL) 500 mg tablet   No No   Sig: Take 2 tablets (1,000 mg total) by mouth every 8 (eight) hours as needed for moderate pain or mild pain   Patient not taking: Reported on 8/8/2024   baclofen 10 mg tablet   No No   Sig: Take 1 tablet (10 mg total) by mouth 3 (three) times a day   Patient not  taking: Reported on 2023   chlorhexidine (PERIDEX) 0.12 % solution   No No   Sig: Apply 15 mL to the mouth or throat 2 (two) times a day   Patient not taking: Reported on 2024   dicyclomine (BENTYL) 20 mg tablet   No No   Sig: Take 1 tablet (20 mg total) by mouth 2 (two) times a day   Patient not taking: Reported on 2024   dicyclomine (BENTYL) 20 mg tablet   No No   Sig: Take 1 tablet (20 mg total) by mouth 2 (two) times a day   famotidine (PEPCID) 20 mg tablet   No No   Sig: Take 1 tablet (20 mg total) by mouth 2 (two) times a day   Patient not taking: Reported on 2024   famotidine (PEPCID) 20 mg tablet   No No   Sig: Take 1 tablet (20 mg total) by mouth 2 (two) times a day for 5 days   fluticasone (FLONASE) 50 mcg/act nasal spray   No No   Si spray into each nostril daily   Patient not taking: Reported on 2023   fluticasone (FLONASE) 50 mcg/act nasal spray   No No   Si spray into each nostril daily for 3 days   ketorolac (ACULAR) 0.5 % ophthalmic solution   No No   Sig: Administer 1 drop into the left eye every 6 (six) hours   Patient not taking: Reported on 2023   lidocaine (Lidoderm) 5 %   No No   Sig: Apply 1 patch topically daily Remove & Discard patch within 12 hours or as directed by MD   Patient not taking: Reported on 2023   loperamide (IMODIUM) 2 mg capsule   No No   Sig: Take 1 capsule (2 mg total) by mouth 4 (four) times a day as needed for diarrhea   methocarbamol (ROBAXIN) 500 mg tablet   No No   Sig: Take 1 tablet (500 mg total) by mouth 2 (two) times a day   Patient not taking: Reported on 2024   naproxen (NAPROSYN) 500 mg tablet   No No   Sig: Take 1 tablet (500 mg total) by mouth 2 (two) times a day with meals   Patient not taking: Reported on 2023   naproxen (Naprosyn) 500 mg tablet   No No   Sig: Take 1 tablet (500 mg total) by mouth 2 (two) times a day with meals   Patient not taking: Reported on 2023   naproxen (Naprosyn) 500 mg tablet   No  "No   Sig: Take 1 tablet (500 mg total) by mouth 2 (two) times a day with meals   Patient not taking: Reported on 8/8/2024   naproxen (Naprosyn) 500 mg tablet   No No   Sig: Take 1 tablet (500 mg total) by mouth 2 (two) times a day with meals   naproxen (Naprosyn) 500 mg tablet   No No   Sig: Take 1 tablet (500 mg total) by mouth 2 (two) times a day with meals   ondansetron (ZOFRAN-ODT) 4 mg disintegrating tablet   No No   Sig: Take 1 tablet (4 mg total) by mouth every 6 (six) hours as needed for nausea or vomiting   Patient not taking: Reported on 8/8/2024   ondansetron (ZOFRAN-ODT) 4 mg disintegrating tablet   No No   Sig: Take 1 tablet (4 mg total) by mouth every 8 (eight) hours as needed for nausea or vomiting      Facility-Administered Medications: None       Portions of the record may have been created with voice recognition software. Occasional wrong word or \"sound a like\" substitutions may have occurred due to the inherent limitations of voice recognition software. Read the chart carefully and recognize, using context, where substitutions have occurred.    Electronically signed by:  MD Juarez Rich MD  05/21/25 0831       Juarez Marie MD  05/21/25 0809       Juarez Marie MD  05/21/25 0839    "

## 2025-05-27 PROCEDURE — 99283 EMERGENCY DEPT VISIT LOW MDM: CPT

## 2025-05-28 ENCOUNTER — HOSPITAL ENCOUNTER (EMERGENCY)
Facility: HOSPITAL | Age: 30
Discharge: HOME | End: 2025-05-28
Payer: COMMERCIAL

## 2025-05-28 VITALS
RESPIRATION RATE: 16 BRPM | SYSTOLIC BLOOD PRESSURE: 118 MMHG | TEMPERATURE: 97.8 F | DIASTOLIC BLOOD PRESSURE: 79 MMHG | OXYGEN SATURATION: 98 % | HEART RATE: 80 BPM

## 2025-05-28 DIAGNOSIS — H18.211: ICD-10-CM

## 2025-05-28 DIAGNOSIS — H16.9 KERATITIS DUE TO INFECTION: Primary | ICD-10-CM

## 2025-05-28 PROCEDURE — 99284 EMERGENCY DEPT VISIT MOD MDM: CPT

## 2025-05-28 RX ORDER — MINERAL OIL AND PETROLATUM 150; 830 MG/G; MG/G
OINTMENT OPHTHALMIC
Status: DISPENSED
Start: 2025-05-28 | End: 2025-05-28

## 2025-05-28 RX ORDER — MINERAL OIL AND PETROLATUM 150; 830 MG/G; MG/G
OINTMENT OPHTHALMIC ONCE
Status: COMPLETED | OUTPATIENT
Start: 2025-05-28 | End: 2025-05-28

## 2025-05-28 RX ORDER — MOXIFLOXACIN 5 MG/ML
1 SOLUTION/ DROPS OPHTHALMIC 3 TIMES DAILY
Qty: 3 ML | Refills: 0 | Status: SHIPPED | OUTPATIENT
Start: 2025-05-28

## 2025-05-28 RX ORDER — TETRACAINE HYDROCHLORIDE 5 MG/ML
1 SOLUTION OPHTHALMIC ONCE
Status: COMPLETED | OUTPATIENT
Start: 2025-05-28 | End: 2025-05-28

## 2025-05-28 RX ORDER — ERYTHROMYCIN 5 MG/G
0.5 OINTMENT OPHTHALMIC ONCE
Status: COMPLETED | OUTPATIENT
Start: 2025-05-28 | End: 2025-05-28

## 2025-05-28 RX ORDER — ERYTHROMYCIN 5 MG/G
OINTMENT OPHTHALMIC
Qty: 3.5 G | Refills: 0 | Status: SHIPPED | OUTPATIENT
Start: 2025-05-28

## 2025-05-28 RX ORDER — MINERAL OIL AND PETROLATUM 150; 830 MG/G; MG/G
OINTMENT OPHTHALMIC ONCE
Status: DISCONTINUED | OUTPATIENT
Start: 2025-05-28 | End: 2025-05-28

## 2025-05-28 RX ADMIN — MINERAL OIL, WHITE PETROLATUM: .03; .94 OINTMENT OPHTHALMIC at 01:00

## 2025-05-28 RX ADMIN — ERYTHROMYCIN 0.5 INCH: 5 OINTMENT OPHTHALMIC at 00:55

## 2025-05-28 RX ADMIN — FLUORESCEIN SODIUM 1 STRIP: 1 STRIP OPHTHALMIC at 00:31

## 2025-05-28 RX ADMIN — TETRACAINE HYDROCHLORIDE 1 DROP: 5 SOLUTION OPHTHALMIC at 00:31

## 2025-05-28 NOTE — DISCHARGE INSTRUCTIONS
Start using moxifloxacin drops instead of ofloxacin. Use erythromycin before bed.   If you develop new or worsening symptoms, please return to the Emergency Department for further evaluation.

## 2025-05-28 NOTE — ED PROVIDER NOTES
Time reflects when diagnosis was documented in both MDM as applicable and the Disposition within this note       Time User Action Codes Description Comment    5/28/2025 12:20 AM Arturo Robbins [H16.9] Keratitis due to infection     5/28/2025 12:20 AM Arturo Robbins [H18.211] Corneal edema due to wearing of contact lenses, right           ED Disposition       ED Disposition   Discharge    Condition   Stable    Date/Time   Wed May 28, 2025 12:57 AM    Comment   Andrea Daniels discharge to home/self care.                   Assessment & Plan       Medical Decision Making  Risk  OTC drugs.  Prescription drug management.        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 Pt seen in ED multiple times over past few months for similar. Pt states he's been using the oflaxacin and ketotifen that were prescribed to him recently with no relief. Ongoing R eye pain, watering. Contact lens wearer states he has no prescription lenses, only wears contacts due to his keratoconus. Has a yearly prescription for contacts that were recently updated earlier this month. Feels the lenses are too tight on the eye.   VSS  R scleral injection noted around the lower portion of the R iris. PERRL, EOM intact. R eye pressure 13.7mmhg. Fluorescein stain negative.   D/w patient concern for keratitis vs keratoconjunctivitis. Recommend patient to stop wearing contact in the R eye for 1 week, continue with lubricating drops. Switch ofloxacin to moxifloxacin. Pt has upcoming eye appt in 7 days. Stable for discharge at this time.          Medications   fluorescein sodium sterile ophthalmic strip 1 strip (1 strip Right Eye Given 5/28/25 0031)   tetracaine 0.5 % ophthalmic solution 1 drop (1 drop Right Eye Given 5/28/25 0031)   erythromycin (ILOTYCIN) 0.5 % ophthalmic ointment 0.5 inch (0.5 inches Right Eye Given 5/28/25 0055)   artificial tear ophthalmic ointment ( Right Eye Given 5/28/25 0100)       ED  Risk Strat Scores                    No data recorded        SBIRT 22yo+      Flowsheet Row Most Recent Value   Initial Alcohol Screen: US AUDIT-C     1. How often do you have a drink containing alcohol? 1 Filed at: 05/28/2025 0009   2. How many drinks containing alcohol do you have on a typical day you are drinking?  0 Filed at: 05/28/2025 0009   3a. Male UNDER 65: How often do you have five or more drinks on one occasion? 1 Filed at: 05/28/2025 0009   Audit-C Score 2 Filed at: 05/28/2025 0009   JO: How many times in the past year have you...    Used an illegal drug or used a prescription medication for non-medical reasons? Never Filed at: 05/28/2025 0009                            History of Present Illness       Chief Complaint   Patient presents with    Eye Problem     Pt reports being here last week, prescribed meds and eyes are still reddened, swollen and causing discomfort R>L. Pt reports having eye dr appts in early June and later June       Past Medical History[1]   Past Surgical History[2]   Family History[3]   Social History[4]   E-Cigarette/Vaping    E-Cigarette Use Never User       E-Cigarette/Vaping Substances    Nicotine No     THC No     CBD No     Flavoring No     Other No     Unknown No       I have reviewed and agree with the history as documented.     HPI  See mdm  Review of Systems   Constitutional:  Negative for chills and fever.   HENT:  Negative for ear pain and sore throat.    Eyes:  Positive for pain and redness. Negative for visual disturbance.   Respiratory:  Negative for cough and shortness of breath.    Cardiovascular:  Negative for chest pain and palpitations.   Gastrointestinal:  Negative for abdominal pain and vomiting.   Genitourinary:  Negative for dysuria and hematuria.   Musculoskeletal:  Negative for arthralgias and back pain.   Skin:  Negative for color change and rash.   Neurological:  Negative for seizures and syncope.   All other systems reviewed and are  negative.          Objective       ED Triage Vitals [05/28/25 0010]   Temperature Pulse Blood Pressure Respirations SpO2 Patient Position - Orthostatic VS   97.8 °F (36.6 °C) 80 118/79 16 98 % Sitting      Temp Source Heart Rate Source BP Location FiO2 (%) Pain Score    Oral Monitor Right arm -- --      Vitals      Date and Time Temp Pulse SpO2 Resp BP Pain Score FACES Pain Rating User   05/28/25 0010 97.8 °F (36.6 °C) 80 98 % 16 118/79 -- -- SAVITA            Physical Exam  Vitals and nursing note reviewed.   Constitutional:       General: He is not in acute distress.     Appearance: He is well-developed. He is not toxic-appearing.   HENT:      Head: Normocephalic and atraumatic.      Right Ear: External ear normal.      Left Ear: External ear normal.      Nose: Nose normal.      Mouth/Throat:      Mouth: Mucous membranes are moist.     Eyes:      Extraocular Movements: Extraocular movements intact.      Pupils: Pupils are equal, round, and reactive to light.      Comments: R scleral injection noted around the lower portion of the R iris. PERRL, EOM intact. R eye pressure 13.7mmhg. Fluorescein stain negative.      Cardiovascular:      Rate and Rhythm: Normal rate and regular rhythm.   Pulmonary:      Effort: Pulmonary effort is normal. No respiratory distress.   Abdominal:      General: Abdomen is flat. There is no distension.     Musculoskeletal:         General: No deformity. Normal range of motion.      Cervical back: Normal range of motion. No rigidity.     Skin:     General: Skin is warm and dry.      Capillary Refill: Capillary refill takes less than 2 seconds.     Neurological:      General: No focal deficit present.      Mental Status: He is alert.     Psychiatric:         Mood and Affect: Mood normal.         Results Reviewed       None            No orders to display       Procedures    ED Medication and Procedure Management   Prior to Admission Medications   Prescriptions Last Dose Informant Patient  Reported? Taking?   Ketotifen Fumarate (ZADITOR) 0.035 % ophthalmic solution   No No   Sig: Administer 1 drop to the right eye 2 (two) times a day   acetaminophen (TYLENOL) 500 mg tablet   No No   Sig: Take 2 tablets (1,000 mg total) by mouth every 8 (eight) hours as needed for moderate pain or mild pain   Patient not taking: Reported on 2024   baclofen 10 mg tablet   No No   Sig: Take 1 tablet (10 mg total) by mouth 3 (three) times a day   Patient not taking: Reported on 2023   cetirizine-pseudoephedrine (ZyrTEC-D) 5-120 MG per tablet   No No   Sig: Take 1 tablet by mouth 2 (two) times a day for 14 days   chlorhexidine (PERIDEX) 0.12 % solution   No No   Sig: Apply 15 mL to the mouth or throat 2 (two) times a day   Patient not taking: Reported on 2024   dicyclomine (BENTYL) 20 mg tablet   No No   Sig: Take 1 tablet (20 mg total) by mouth 2 (two) times a day   Patient not taking: Reported on 2024   dicyclomine (BENTYL) 20 mg tablet   No No   Sig: Take 1 tablet (20 mg total) by mouth 2 (two) times a day   diphenhydrAMINE (BENADRYL) 25 mg tablet   No No   Sig: Take 1 tablet (25 mg total) by mouth daily at bedtime as needed for itching   famotidine (PEPCID) 20 mg tablet   No No   Sig: Take 1 tablet (20 mg total) by mouth 2 (two) times a day   Patient not taking: Reported on 2024   famotidine (PEPCID) 20 mg tablet   No No   Sig: Take 1 tablet (20 mg total) by mouth 2 (two) times a day for 5 days   fluticasone (FLONASE) 50 mcg/act nasal spray   No No   Si spray into each nostril daily   Patient not taking: Reported on 2023   fluticasone (FLONASE) 50 mcg/act nasal spray   No No   Si spray into each nostril daily for 3 days   ketorolac (ACULAR) 0.5 % ophthalmic solution   No No   Sig: Administer 1 drop into the left eye every 6 (six) hours   Patient not taking: Reported on 2023   lidocaine (Lidoderm) 5 %   No No   Sig: Apply 1 patch topically daily Remove & Discard patch within 12  hours or as directed by MD   Patient not taking: Reported on 7/14/2023   loperamide (IMODIUM) 2 mg capsule   No No   Sig: Take 1 capsule (2 mg total) by mouth 4 (four) times a day as needed for diarrhea   methocarbamol (ROBAXIN) 500 mg tablet   No No   Sig: Take 1 tablet (500 mg total) by mouth 2 (two) times a day   Patient not taking: Reported on 8/8/2024   naproxen (NAPROSYN) 500 mg tablet   No No   Sig: Take 1 tablet (500 mg total) by mouth 2 (two) times a day with meals   Patient not taking: Reported on 7/14/2023   naproxen (Naprosyn) 500 mg tablet   No No   Sig: Take 1 tablet (500 mg total) by mouth 2 (two) times a day with meals   Patient not taking: Reported on 7/14/2023   naproxen (Naprosyn) 500 mg tablet   No No   Sig: Take 1 tablet (500 mg total) by mouth 2 (two) times a day with meals   Patient not taking: Reported on 8/8/2024   naproxen (Naprosyn) 500 mg tablet   No No   Sig: Take 1 tablet (500 mg total) by mouth 2 (two) times a day with meals   naproxen (Naprosyn) 500 mg tablet   No No   Sig: Take 1 tablet (500 mg total) by mouth 2 (two) times a day with meals   ofloxacin (OCUFLOX) 0.3 % ophthalmic solution   No No   Sig: Administer 1 drop to both eyes every 4 (four) hours   ondansetron (ZOFRAN-ODT) 4 mg disintegrating tablet   No No   Sig: Take 1 tablet (4 mg total) by mouth every 6 (six) hours as needed for nausea or vomiting   Patient not taking: Reported on 8/8/2024   ondansetron (ZOFRAN-ODT) 4 mg disintegrating tablet   No No   Sig: Take 1 tablet (4 mg total) by mouth every 8 (eight) hours as needed for nausea or vomiting      Facility-Administered Medications: None     Discharge Medication List as of 5/28/2025 12:57 AM        START taking these medications    Details   erythromycin (ILOTYCIN) ophthalmic ointment Place a 1/2 inch ribbon of ointment into the lower eyelid nightly, Normal      moxifloxacin (VIGAMOX) 0.5 % ophthalmic solution Administer 1 drop to the right eye 3 (three) times a day,  Starting Wed 5/28/2025, Normal           CONTINUE these medications which have NOT CHANGED    Details   acetaminophen (TYLENOL) 500 mg tablet Take 2 tablets (1,000 mg total) by mouth every 8 (eight) hours as needed for moderate pain or mild pain, Starting Wed 9/20/2023, Normal      baclofen 10 mg tablet Take 1 tablet (10 mg total) by mouth 3 (three) times a day, Starting Fri 10/14/2022, Normal      cetirizine-pseudoephedrine (ZyrTEC-D) 5-120 MG per tablet Take 1 tablet by mouth 2 (two) times a day for 14 days, Starting Tue 5/20/2025, Until Tue 6/3/2025, Normal      chlorhexidine (PERIDEX) 0.12 % solution Apply 15 mL to the mouth or throat 2 (two) times a day, Starting Fri 7/14/2023, Normal      !! dicyclomine (BENTYL) 20 mg tablet Take 1 tablet (20 mg total) by mouth 2 (two) times a day, Starting Wed 9/20/2023, Normal      !! dicyclomine (BENTYL) 20 mg tablet Take 1 tablet (20 mg total) by mouth 2 (two) times a day, Starting Wed 2/5/2025, Normal      diphenhydrAMINE (BENADRYL) 25 mg tablet Take 1 tablet (25 mg total) by mouth daily at bedtime as needed for itching, Starting Tue 5/20/2025, Normal      famotidine (PEPCID) 20 mg tablet Take 1 tablet (20 mg total) by mouth 2 (two) times a day, Starting Wed 10/18/2023, Normal      fluticasone (FLONASE) 50 mcg/act nasal spray 1 spray into each nostril daily, Starting Fri 10/21/2022, Normal      ketorolac (ACULAR) 0.5 % ophthalmic solution Administer 1 drop into the left eye every 6 (six) hours, Starting Fri 7/15/2022, Normal      Ketotifen Fumarate (ZADITOR) 0.035 % ophthalmic solution Administer 1 drop to the right eye 2 (two) times a day, Starting Tue 5/20/2025, Normal      lidocaine (Lidoderm) 5 % Apply 1 patch topically daily Remove & Discard patch within 12 hours or as directed by MD, Starting Fri 10/14/2022, Normal      loperamide (IMODIUM) 2 mg capsule Take 1 capsule (2 mg total) by mouth 4 (four) times a day as needed for diarrhea, Starting Wed 2/5/2025, Normal       methocarbamol (ROBAXIN) 500 mg tablet Take 1 tablet (500 mg total) by mouth 2 (two) times a day, Starting Wed 1/17/2024, Normal      !! naproxen (NAPROSYN) 500 mg tablet Take 1 tablet (500 mg total) by mouth 2 (two) times a day with meals, Starting Fri 10/14/2022, Normal      !! naproxen (Naprosyn) 500 mg tablet Take 1 tablet (500 mg total) by mouth 2 (two) times a day with meals, Starting Fri 10/21/2022, Normal      !! naproxen (Naprosyn) 500 mg tablet Take 1 tablet (500 mg total) by mouth 2 (two) times a day with meals, Starting Fri 7/14/2023, Normal      !! naproxen (Naprosyn) 500 mg tablet Take 1 tablet (500 mg total) by mouth 2 (two) times a day with meals, Starting Wed 2/5/2025, Normal      !! naproxen (Naprosyn) 500 mg tablet Take 1 tablet (500 mg total) by mouth 2 (two) times a day with meals, Starting Tue 4/15/2025, Normal      ofloxacin (OCUFLOX) 0.3 % ophthalmic solution Administer 1 drop to both eyes every 4 (four) hours, Starting Tue 5/20/2025, Normal      !! ondansetron (ZOFRAN-ODT) 4 mg disintegrating tablet Take 1 tablet (4 mg total) by mouth every 6 (six) hours as needed for nausea or vomiting, Starting Wed 9/20/2023, Normal      !! ondansetron (ZOFRAN-ODT) 4 mg disintegrating tablet Take 1 tablet (4 mg total) by mouth every 8 (eight) hours as needed for nausea or vomiting, Starting Wed 2/5/2025, Normal       !! - Potential duplicate medications found. Please discuss with provider.        No discharge procedures on file.  ED SEPSIS DOCUMENTATION   Time reflects when diagnosis was documented in both MDM as applicable and the Disposition within this note       Time User Action Codes Description Comment    5/28/2025 12:20 AM Arturo Robbins Add [H16.9] Keratitis due to infection     5/28/2025 12:20 AM Arturo Robbins Add [H18.211] Corneal edema due to wearing of contact lenses, right                    [1]   Past Medical History:  Diagnosis Date    Eye abnormality     Keratoconus of both eyes    [2]    Past Surgical History:  Procedure Laterality Date    EYE SURGERY Right    [3] No family history on file.  [4]   Social History  Tobacco Use    Smoking status: Former     Types: Cigars    Smokeless tobacco: Never   Vaping Use    Vaping status: Never Used   Substance Use Topics    Alcohol use: Yes     Comment: rare    Drug use: Not Currently     Frequency: 7.0 times per week     Types: Marijuana     Comment: denies presently 08/2024        Arturo Robbins MD  05/28/25 0125

## 2025-05-28 NOTE — Clinical Note
Nba accompanied Andrea Carlos Alberto Daniels to the emergency department on 5/27/2025.    Return date if applicable: 05/29/2025        If you have any questions or concerns, please don't hesitate to call.      Arturo Robbins MD

## 2025-05-28 NOTE — Clinical Note
Andrea Daniels was seen and treated in our emergency department on 5/27/2025.                Diagnosis: Right eye keratitis    Andrea  may return to work on return date.    He may return on this date: 05/30/2025    Recommend avoiding contact lens x1 week in right eye which may impact ability to drive, please accommodate as appropriate.      If you have any questions or concerns, please don't hesitate to call.      Arturo Robbins MD    ______________________________           _______________          _______________  Hospital Representative                              Date                                Time